# Patient Record
Sex: MALE | Race: WHITE | Employment: FULL TIME | URBAN - METROPOLITAN AREA
[De-identification: names, ages, dates, MRNs, and addresses within clinical notes are randomized per-mention and may not be internally consistent; named-entity substitution may affect disease eponyms.]

---

## 2017-01-03 ENCOUNTER — OFFICE VISIT (OUTPATIENT)
Dept: FAMILY MEDICINE CLINIC | Age: 47
End: 2017-01-03

## 2017-01-03 VITALS
SYSTOLIC BLOOD PRESSURE: 136 MMHG | HEIGHT: 70 IN | WEIGHT: 208 LBS | HEART RATE: 91 BPM | DIASTOLIC BLOOD PRESSURE: 88 MMHG | BODY MASS INDEX: 29.78 KG/M2 | TEMPERATURE: 97.7 F | RESPIRATION RATE: 16 BRPM

## 2017-01-03 DIAGNOSIS — M77.8 TENDINITIS OF RIGHT WRIST: Primary | ICD-10-CM

## 2017-01-03 DIAGNOSIS — M18.11 DEGENERATIVE ARTHRITIS OF THUMB, RIGHT: ICD-10-CM

## 2017-01-03 PROCEDURE — 99213 OFFICE O/P EST LOW 20 MIN: CPT | Performed by: FAMILY MEDICINE

## 2017-01-03 RX ORDER — PREDNISONE 20 MG/1
TABLET ORAL
Qty: 18 TABLET | Refills: 0 | Status: SHIPPED | OUTPATIENT
Start: 2017-01-03 | End: 2017-01-13

## 2017-01-03 RX ORDER — DICLOFENAC SODIUM 75 MG/1
75 TABLET, DELAYED RELEASE ORAL 2 TIMES DAILY
Qty: 60 TABLET | Refills: 3 | Status: SHIPPED | OUTPATIENT
Start: 2017-01-03 | End: 2017-11-08 | Stop reason: ALTCHOICE

## 2017-01-08 ASSESSMENT — ENCOUNTER SYMPTOMS
CHEST TIGHTNESS: 0
BACK PAIN: 0
COUGH: 0

## 2017-01-31 ENCOUNTER — OFFICE VISIT (OUTPATIENT)
Dept: FAMILY MEDICINE CLINIC | Age: 47
End: 2017-01-31

## 2017-01-31 VITALS
SYSTOLIC BLOOD PRESSURE: 134 MMHG | BODY MASS INDEX: 30.55 KG/M2 | WEIGHT: 213.4 LBS | HEART RATE: 78 BPM | TEMPERATURE: 97.5 F | DIASTOLIC BLOOD PRESSURE: 84 MMHG | HEIGHT: 70 IN | RESPIRATION RATE: 20 BRPM

## 2017-01-31 DIAGNOSIS — M18.11 LOCALIZED PRIMARY OSTEOARTHRITIS OF CARPOMETACARPAL JOINT OF RIGHT THUMB: ICD-10-CM

## 2017-01-31 DIAGNOSIS — M67.431 GANGLION OF WRIST, RIGHT: ICD-10-CM

## 2017-01-31 DIAGNOSIS — M18.12 LOCALIZED PRIMARY OSTEOARTHRITIS OF CARPOMETACARPAL JOINT OF LEFT THUMB: Primary | ICD-10-CM

## 2017-01-31 DIAGNOSIS — F17.200 TOBACCO USE DISORDER: ICD-10-CM

## 2017-01-31 PROCEDURE — 4004F PT TOBACCO SCREEN RCVD TLK: CPT | Performed by: FAMILY MEDICINE

## 2017-01-31 PROCEDURE — G8427 DOCREV CUR MEDS BY ELIG CLIN: HCPCS | Performed by: FAMILY MEDICINE

## 2017-01-31 PROCEDURE — G8484 FLU IMMUNIZE NO ADMIN: HCPCS | Performed by: FAMILY MEDICINE

## 2017-01-31 PROCEDURE — G8419 CALC BMI OUT NRM PARAM NOF/U: HCPCS | Performed by: FAMILY MEDICINE

## 2017-01-31 PROCEDURE — 99213 OFFICE O/P EST LOW 20 MIN: CPT | Performed by: FAMILY MEDICINE

## 2017-01-31 RX ORDER — VARENICLINE TARTRATE 25 MG
KIT ORAL
Qty: 56 TABLET | Refills: 0 | Status: SHIPPED | OUTPATIENT
Start: 2017-01-31 | End: 2017-05-23

## 2017-01-31 RX ORDER — VARENICLINE TARTRATE 1 MG/1
1 TABLET, FILM COATED ORAL 2 TIMES DAILY
Qty: 60 TABLET | Refills: 1 | Status: SHIPPED | OUTPATIENT
Start: 2017-01-31 | End: 2017-05-23

## 2017-01-31 RX ORDER — HYDROCODONE BITARTRATE AND ACETAMINOPHEN 7.5; 325 MG/1; MG/1
1 TABLET ORAL EVERY 8 HOURS PRN
Qty: 50 TABLET | Refills: 0 | Status: SHIPPED | OUTPATIENT
Start: 2017-01-31 | End: 2017-03-17 | Stop reason: SDUPTHER

## 2017-01-31 ASSESSMENT — PATIENT HEALTH QUESTIONNAIRE - PHQ9
1. LITTLE INTEREST OR PLEASURE IN DOING THINGS: 0
SUM OF ALL RESPONSES TO PHQ9 QUESTIONS 1 & 2: 0
SUM OF ALL RESPONSES TO PHQ QUESTIONS 1-9: 0
2. FEELING DOWN, DEPRESSED OR HOPELESS: 0

## 2017-03-16 ENCOUNTER — TELEPHONE (OUTPATIENT)
Dept: FAMILY MEDICINE CLINIC | Age: 47
End: 2017-03-16

## 2017-03-17 ENCOUNTER — OFFICE VISIT (OUTPATIENT)
Dept: FAMILY MEDICINE CLINIC | Age: 47
End: 2017-03-17

## 2017-03-17 VITALS
HEIGHT: 70 IN | BODY MASS INDEX: 29.35 KG/M2 | SYSTOLIC BLOOD PRESSURE: 122 MMHG | HEART RATE: 86 BPM | TEMPERATURE: 97.8 F | RESPIRATION RATE: 16 BRPM | DIASTOLIC BLOOD PRESSURE: 88 MMHG | WEIGHT: 205 LBS

## 2017-03-17 DIAGNOSIS — R94.2 ABNORMAL LUNG FUNCTION TEST: ICD-10-CM

## 2017-03-17 DIAGNOSIS — M54.50 CHRONIC BILATERAL LOW BACK PAIN WITHOUT SCIATICA: ICD-10-CM

## 2017-03-17 DIAGNOSIS — M18.11 LOCALIZED PRIMARY OSTEOARTHRITIS OF CARPOMETACARPAL JOINT OF RIGHT THUMB: ICD-10-CM

## 2017-03-17 DIAGNOSIS — G89.29 CHRONIC BILATERAL LOW BACK PAIN WITHOUT SCIATICA: ICD-10-CM

## 2017-03-17 DIAGNOSIS — R06.02 SOB (SHORTNESS OF BREATH): Primary | ICD-10-CM

## 2017-03-17 PROCEDURE — 4004F PT TOBACCO SCREEN RCVD TLK: CPT | Performed by: FAMILY MEDICINE

## 2017-03-17 PROCEDURE — G8419 CALC BMI OUT NRM PARAM NOF/U: HCPCS | Performed by: FAMILY MEDICINE

## 2017-03-17 PROCEDURE — G8427 DOCREV CUR MEDS BY ELIG CLIN: HCPCS | Performed by: FAMILY MEDICINE

## 2017-03-17 PROCEDURE — G8484 FLU IMMUNIZE NO ADMIN: HCPCS | Performed by: FAMILY MEDICINE

## 2017-03-17 PROCEDURE — 99213 OFFICE O/P EST LOW 20 MIN: CPT | Performed by: FAMILY MEDICINE

## 2017-03-17 RX ORDER — HYDROCODONE BITARTRATE AND ACETAMINOPHEN 7.5; 325 MG/1; MG/1
1 TABLET ORAL EVERY 8 HOURS PRN
Qty: 50 TABLET | Refills: 0 | Status: SHIPPED | OUTPATIENT
Start: 2017-03-17 | End: 2017-11-08

## 2017-03-23 ENCOUNTER — HOSPITAL ENCOUNTER (OUTPATIENT)
Dept: PULMONOLOGY | Age: 47
Discharge: HOME | End: 2017-03-23
Payer: COMMERCIAL

## 2017-03-23 DIAGNOSIS — R94.2 ABNORMAL LUNG FUNCTION TEST: ICD-10-CM

## 2017-03-23 DIAGNOSIS — R06.02 SOB (SHORTNESS OF BREATH): ICD-10-CM

## 2017-03-23 PROCEDURE — 94729 DIFFUSING CAPACITY: CPT

## 2017-03-23 PROCEDURE — 6360000002 HC RX W HCPCS: Performed by: FAMILY MEDICINE

## 2017-03-23 PROCEDURE — 94729 DIFFUSING CAPACITY: CPT | Performed by: INTERNAL MEDICINE

## 2017-03-23 PROCEDURE — 94726 PLETHYSMOGRAPHY LUNG VOLUMES: CPT | Performed by: INTERNAL MEDICINE

## 2017-03-23 PROCEDURE — 94726 PLETHYSMOGRAPHY LUNG VOLUMES: CPT

## 2017-03-23 PROCEDURE — 94060 EVALUATION OF WHEEZING: CPT | Performed by: INTERNAL MEDICINE

## 2017-03-23 PROCEDURE — 94060 EVALUATION OF WHEEZING: CPT

## 2017-03-23 RX ORDER — ALBUTEROL SULFATE 2.5 MG/3ML
2.5 SOLUTION RESPIRATORY (INHALATION) ONCE
Status: COMPLETED | OUTPATIENT
Start: 2017-03-23 | End: 2017-03-23

## 2017-03-23 RX ADMIN — ALBUTEROL SULFATE 2.5 MG: 2.5 SOLUTION RESPIRATORY (INHALATION) at 15:56

## 2017-03-28 ENCOUNTER — OFFICE VISIT (OUTPATIENT)
Dept: FAMILY MEDICINE CLINIC | Age: 47
End: 2017-03-28

## 2017-03-28 VITALS
DIASTOLIC BLOOD PRESSURE: 76 MMHG | RESPIRATION RATE: 18 BRPM | HEART RATE: 84 BPM | HEIGHT: 70 IN | BODY MASS INDEX: 29.84 KG/M2 | WEIGHT: 208.4 LBS | TEMPERATURE: 98.8 F | SYSTOLIC BLOOD PRESSURE: 118 MMHG

## 2017-03-28 DIAGNOSIS — J44.9 MODERATE COPD (CHRONIC OBSTRUCTIVE PULMONARY DISEASE) (HCC): Primary | ICD-10-CM

## 2017-03-28 PROCEDURE — G8484 FLU IMMUNIZE NO ADMIN: HCPCS | Performed by: FAMILY MEDICINE

## 2017-03-28 PROCEDURE — 3023F SPIROM DOC REV: CPT | Performed by: FAMILY MEDICINE

## 2017-03-28 PROCEDURE — G8417 CALC BMI ABV UP PARAM F/U: HCPCS | Performed by: FAMILY MEDICINE

## 2017-03-28 PROCEDURE — G8427 DOCREV CUR MEDS BY ELIG CLIN: HCPCS | Performed by: FAMILY MEDICINE

## 2017-03-28 PROCEDURE — 4004F PT TOBACCO SCREEN RCVD TLK: CPT | Performed by: FAMILY MEDICINE

## 2017-03-28 PROCEDURE — G8926 SPIRO NO PERF OR DOC: HCPCS | Performed by: FAMILY MEDICINE

## 2017-03-28 PROCEDURE — 99213 OFFICE O/P EST LOW 20 MIN: CPT | Performed by: FAMILY MEDICINE

## 2017-04-03 ENCOUNTER — TELEPHONE (OUTPATIENT)
Dept: FAMILY MEDICINE CLINIC | Age: 47
End: 2017-04-03

## 2017-05-23 ENCOUNTER — OFFICE VISIT (OUTPATIENT)
Dept: PULMONOLOGY | Age: 47
End: 2017-05-23

## 2017-05-23 VITALS
BODY MASS INDEX: 29.2 KG/M2 | OXYGEN SATURATION: 98 % | HEIGHT: 70 IN | HEART RATE: 84 BPM | TEMPERATURE: 97.7 F | WEIGHT: 204 LBS | DIASTOLIC BLOOD PRESSURE: 86 MMHG | RESPIRATION RATE: 16 BRPM | SYSTOLIC BLOOD PRESSURE: 138 MMHG

## 2017-05-23 DIAGNOSIS — G47.33 OBSTRUCTIVE SLEEP APNEA SYNDROME: ICD-10-CM

## 2017-05-23 DIAGNOSIS — J44.9 CHRONIC OBSTRUCTIVE PULMONARY DISEASE, UNSPECIFIED COPD TYPE (HCC): Primary | ICD-10-CM

## 2017-05-23 PROCEDURE — G8427 DOCREV CUR MEDS BY ELIG CLIN: HCPCS | Performed by: INTERNAL MEDICINE

## 2017-05-23 PROCEDURE — 3023F SPIROM DOC REV: CPT | Performed by: INTERNAL MEDICINE

## 2017-05-23 PROCEDURE — G8926 SPIRO NO PERF OR DOC: HCPCS | Performed by: INTERNAL MEDICINE

## 2017-05-23 PROCEDURE — 99204 OFFICE O/P NEW MOD 45 MIN: CPT | Performed by: INTERNAL MEDICINE

## 2017-05-23 PROCEDURE — 4004F PT TOBACCO SCREEN RCVD TLK: CPT | Performed by: INTERNAL MEDICINE

## 2017-05-23 PROCEDURE — G8417 CALC BMI ABV UP PARAM F/U: HCPCS | Performed by: INTERNAL MEDICINE

## 2017-05-23 RX ORDER — FLUTICASONE FUROATE AND VILANTEROL 100; 25 UG/1; UG/1
1 POWDER RESPIRATORY (INHALATION) DAILY
Qty: 1 EACH | Refills: 5 | Status: SHIPPED | OUTPATIENT
Start: 2017-05-23 | End: 2017-08-23 | Stop reason: SDUPTHER

## 2017-05-23 RX ORDER — ALBUTEROL SULFATE 90 UG/1
2 AEROSOL, METERED RESPIRATORY (INHALATION) EVERY 6 HOURS PRN
Qty: 1 INHALER | Refills: 5 | Status: SHIPPED | OUTPATIENT
Start: 2017-05-23 | End: 2017-08-23 | Stop reason: SDUPTHER

## 2017-05-23 ASSESSMENT — ENCOUNTER SYMPTOMS
RHINORRHEA: 0
ABDOMINAL PAIN: 0
NAUSEA: 0
SORE THROAT: 0
CHEST TIGHTNESS: 0
SHORTNESS OF BREATH: 1
COUGH: 1
WHEEZING: 1
VOMITING: 0
DIARRHEA: 0
SINUS PRESSURE: 0

## 2017-08-23 ENCOUNTER — OFFICE VISIT (OUTPATIENT)
Dept: PULMONOLOGY | Age: 47
End: 2017-08-23

## 2017-08-23 VITALS
WEIGHT: 200 LBS | BODY MASS INDEX: 28.63 KG/M2 | HEART RATE: 79 BPM | RESPIRATION RATE: 14 BRPM | TEMPERATURE: 98.2 F | DIASTOLIC BLOOD PRESSURE: 74 MMHG | HEIGHT: 70 IN | SYSTOLIC BLOOD PRESSURE: 120 MMHG | OXYGEN SATURATION: 98 %

## 2017-08-23 DIAGNOSIS — Z72.0 TOBACCO ABUSE: ICD-10-CM

## 2017-08-23 DIAGNOSIS — G47.33 OBSTRUCTIVE SLEEP APNEA SYNDROME: ICD-10-CM

## 2017-08-23 DIAGNOSIS — J44.9 CHRONIC OBSTRUCTIVE PULMONARY DISEASE, UNSPECIFIED COPD TYPE (HCC): Primary | ICD-10-CM

## 2017-08-23 PROCEDURE — G8926 SPIRO NO PERF OR DOC: HCPCS | Performed by: PHYSICIAN ASSISTANT

## 2017-08-23 PROCEDURE — 4004F PT TOBACCO SCREEN RCVD TLK: CPT | Performed by: PHYSICIAN ASSISTANT

## 2017-08-23 PROCEDURE — 3023F SPIROM DOC REV: CPT | Performed by: PHYSICIAN ASSISTANT

## 2017-08-23 PROCEDURE — G8427 DOCREV CUR MEDS BY ELIG CLIN: HCPCS | Performed by: PHYSICIAN ASSISTANT

## 2017-08-23 PROCEDURE — 99213 OFFICE O/P EST LOW 20 MIN: CPT | Performed by: PHYSICIAN ASSISTANT

## 2017-08-23 PROCEDURE — G8417 CALC BMI ABV UP PARAM F/U: HCPCS | Performed by: PHYSICIAN ASSISTANT

## 2017-08-23 RX ORDER — ALBUTEROL SULFATE 90 UG/1
2 AEROSOL, METERED RESPIRATORY (INHALATION) EVERY 6 HOURS PRN
Qty: 1 INHALER | Refills: 5 | Status: SHIPPED | OUTPATIENT
Start: 2017-08-23

## 2017-08-23 RX ORDER — FLUTICASONE FUROATE AND VILANTEROL 100; 25 UG/1; UG/1
1 POWDER RESPIRATORY (INHALATION) DAILY
Qty: 1 EACH | Refills: 5 | Status: SHIPPED | OUTPATIENT
Start: 2017-08-23 | End: 2019-02-19

## 2017-08-23 ASSESSMENT — ENCOUNTER SYMPTOMS
ABDOMINAL PAIN: 0
WHEEZING: 0
SINUS PRESSURE: 0
COUGH: 1
NAUSEA: 0
DIARRHEA: 0
SORE THROAT: 0
RHINORRHEA: 0
SHORTNESS OF BREATH: 1
VOMITING: 0
CHEST TIGHTNESS: 0

## 2017-10-24 ENCOUNTER — OFFICE VISIT (OUTPATIENT)
Dept: FAMILY MEDICINE CLINIC | Age: 47
End: 2017-10-24

## 2017-10-24 VITALS
SYSTOLIC BLOOD PRESSURE: 118 MMHG | HEIGHT: 69 IN | DIASTOLIC BLOOD PRESSURE: 78 MMHG | BODY MASS INDEX: 31.01 KG/M2 | WEIGHT: 209.4 LBS | HEART RATE: 87 BPM | RESPIRATION RATE: 18 BRPM | TEMPERATURE: 96.9 F

## 2017-10-24 DIAGNOSIS — K43.9 VENTRAL HERNIA WITHOUT OBSTRUCTION OR GANGRENE: Primary | ICD-10-CM

## 2017-10-24 PROCEDURE — G8484 FLU IMMUNIZE NO ADMIN: HCPCS | Performed by: INTERNAL MEDICINE

## 2017-10-24 PROCEDURE — 99213 OFFICE O/P EST LOW 20 MIN: CPT | Performed by: INTERNAL MEDICINE

## 2017-10-24 PROCEDURE — 4004F PT TOBACCO SCREEN RCVD TLK: CPT | Performed by: INTERNAL MEDICINE

## 2017-10-24 PROCEDURE — G8417 CALC BMI ABV UP PARAM F/U: HCPCS | Performed by: INTERNAL MEDICINE

## 2017-10-24 PROCEDURE — G8427 DOCREV CUR MEDS BY ELIG CLIN: HCPCS | Performed by: INTERNAL MEDICINE

## 2017-10-24 RX ORDER — ACETAMINOPHEN 500 MG
1000 TABLET ORAL EVERY 6 HOURS PRN
Qty: 120 TABLET | Refills: 3 | Status: SHIPPED | OUTPATIENT
Start: 2017-10-24 | End: 2019-02-19

## 2017-10-24 NOTE — PROGRESS NOTES
Subjective:      Patient ID: Anika Crespo is a 41 y.o. male    HPI   Patient presents with midline supraumbilical abdominal bulge x 4 years. First noticed by family member and not the patient. Since then bulge has increased in size. Associated dull pain, rated 8/10, non radiating, worse with straining or lifting heavy objects, improved with no activity. No fever or chills, no constipation or diarrhea, no nausea or vomiting. Patient works in [employer] and occasionally  carries heavy objects. Positive history of 3 inguinal hernias with repair at ages 5, 3 and 17 years. Past Medical History:   Diagnosis Date    Knee pain     left     Lower back pain      Past Surgical History:   Procedure Laterality Date    HERNIA REPAIR      3 hernia repairs    KNEE SURGERY  11 years ago    left     Social History     Social History    Marital status:      Spouse name: N/A    Number of children: N/A    Years of education: N/A     Occupational History    Not on file. Social History Main Topics    Smoking status: Current Every Day Smoker     Packs/day: 1.50    Smokeless tobacco: Never Used    Alcohol use 5.0 oz/week     10 drink(s) per week    Drug use: No    Sexual activity: Not on file     Other Topics Concern    Not on file     Social History Narrative    No narrative on file     History reviewed. No pertinent family history. Allergies:  Review of patient's allergies indicates no known allergies.   Patient Active Problem List   Diagnosis    Hyperlipidemia    Chronic low back pain    Sleep apnea     Current Outpatient Prescriptions on File Prior to Visit   Medication Sig Dispense Refill    fluticasone-vilanterol (BREO ELLIPTA) 100-25 MCG/INH AEPB inhaler Inhale 1 puff into the lungs daily 1 each 5    albuterol sulfate HFA (PROAIR HFA) 108 (90 Base) MCG/ACT inhaler Inhale 2 puffs into the lungs every 6 hours as needed for Wheezing 1 Inhaler 5    HYDROcodone-acetaminophen (NORCO) 7.5-325 MG per tablet Take 1 tablet by mouth every 8 hours as needed for Pain . 50 tablet 0    diclofenac (VOLTAREN) 75 MG EC tablet Take 1 tablet by mouth 2 times daily 60 tablet 3    ibuprofen (ADVIL;MOTRIN) 800 MG tablet Take 800 mg by mouth 2 times daily as needed. No current facility-administered medications on file prior to visit. Review of Systems   Constitutional: Negative for activity change, appetite change, chills, diaphoresis, fatigue, fever and unexpected weight change. HENT: Negative for congestion, dental problem, drooling, ear discharge, ear pain, hearing loss, mouth sores, sore throat, trouble swallowing and voice change. Eyes: Negative for photophobia, pain, discharge, redness and itching. Respiratory: Negative for cough, shortness of breath and wheezing. Gastrointestinal: Negative for abdominal distention, abdominal pain, blood in stool, constipation, diarrhea and nausea. Endocrine: Negative for cold intolerance, heat intolerance, polydipsia and polyuria. Genitourinary: Negative for decreased urine volume, difficulty urinating, dysuria, flank pain, frequency, hematuria and urgency. Musculoskeletal: Negative for arthralgias, back pain and joint swelling. Skin: Negative for color change. Allergic/Immunologic: Negative for environmental allergies and food allergies. Neurological: Negative for dizziness, seizures, syncope, weakness, light-headedness, numbness and headaches. Psychiatric/Behavioral: Negative for agitation, decreased concentration, dysphoric mood and hallucinations. The patient is not nervous/anxious. Objective:   /78   Pulse 87   Temp 96.9 °F (36.1 °C) (Temporal)   Resp 18   Ht 5' 9\" (1.753 m)   Wt 209 lb 6.4 oz (95 kg)   BMI 30.92 kg/m²     Physical Exam   Constitutional: He appears well-developed and well-nourished. No distress. Cardiovascular: Normal rate, regular rhythm, S1 normal, S2 normal and normal heart sounds. Pulmonary/Chest: No accessory muscle usage. No tachypnea. No respiratory distress. He has no wheezes. He has no rales. Abdominal: Soft. Bowel sounds are normal. He exhibits mass (midline supraumbilical mass most prominenet upon attempt to lie down, coughing or sitting up. Mass measures about 6x6cm, soft reducible and non TTP). There is no tenderness. There is no rebound and no guarding. Musculoskeletal: He exhibits no edema. Skin: He is not diaphoretic. Assessment:      1. Ventral hernia without obstruction or gangrene  [address] [facility]    acetaminophen (APAP EXTRA STRENGTH) 500 MG tablet         Plan:      Orders Placed This Encounter   Procedures   [address] [place] [address] [place] Surgery     Referral Priority:   Routine     Referral Type:   Consult for Advice and Opinion     Referral Reason:   Specialty Services Required     Referred to Provider:   Duglas Mejia MD     Requested Specialty:   General Surgery     Number of Visits Requested:   1     Orders Placed This Encounter   Medications    acetaminophen (APAP EXTRA STRENGTH) 500 MG tablet     Sig: Take 2 tablets by mouth every 6 hours as needed for Pain     Dispense:  120 tablet     Refill:  3       Return if symptoms worsen or fail to improve.

## 2017-10-25 ASSESSMENT — ENCOUNTER SYMPTOMS
COUGH: 0
COLOR CHANGE: 0
PHOTOPHOBIA: 0
DIARRHEA: 0
BLOOD IN STOOL: 0
ABDOMINAL PAIN: 0
WHEEZING: 0
SORE THROAT: 0
EYE PAIN: 0
EYE ITCHING: 0
EYE DISCHARGE: 0
SHORTNESS OF BREATH: 0
ABDOMINAL DISTENTION: 0
EYE REDNESS: 0
NAUSEA: 0
VOICE CHANGE: 0
TROUBLE SWALLOWING: 0
CONSTIPATION: 0
BACK PAIN: 0

## 2017-11-08 ENCOUNTER — OFFICE VISIT (OUTPATIENT)
Dept: SURGERY | Age: 47
End: 2017-11-08

## 2017-11-08 VITALS
DIASTOLIC BLOOD PRESSURE: 86 MMHG | SYSTOLIC BLOOD PRESSURE: 130 MMHG | HEIGHT: 70 IN | BODY MASS INDEX: 29.78 KG/M2 | TEMPERATURE: 97.2 F | WEIGHT: 208 LBS

## 2017-11-08 DIAGNOSIS — K43.6 VENTRAL HERNIA WITH OBSTRUCTION AND WITHOUT GANGRENE: Primary | ICD-10-CM

## 2017-11-08 PROCEDURE — G8484 FLU IMMUNIZE NO ADMIN: HCPCS | Performed by: SURGERY

## 2017-11-08 PROCEDURE — 99201 PR OFFICE OUTPATIENT NEW 10 MINUTES: CPT | Performed by: SURGERY

## 2017-11-08 PROCEDURE — G8417 CALC BMI ABV UP PARAM F/U: HCPCS | Performed by: SURGERY

## 2017-11-08 PROCEDURE — G8427 DOCREV CUR MEDS BY ELIG CLIN: HCPCS | Performed by: SURGERY

## 2017-11-08 PROCEDURE — 4004F PT TOBACCO SCREEN RCVD TLK: CPT | Performed by: SURGERY

## 2017-11-08 NOTE — PROGRESS NOTES
Subjective:      Patient ID: Tigre Nevarez is a 41 y.o. male. HPI  Patient presents today with complaints of a ventral hernia. He has noted a lump on the abdominal wall for 5 to 6 years but recently he has noted increasing discomfort in the area especially with lifting. Past Medical History:   Diagnosis Date    COPD (chronic obstructive pulmonary disease) (HCC)     Knee pain     left     Lower back pain      Past Surgical History:   Procedure Laterality Date    HERNIA REPAIR      3 hernia repairs    KNEE SURGERY  11 years ago    left     Current Outpatient Prescriptions   Medication Sig Dispense Refill    acetaminophen (APAP EXTRA STRENGTH) 500 MG tablet Take 2 tablets by mouth every 6 hours as needed for Pain 120 tablet 3    fluticasone-vilanterol (BREO ELLIPTA) 100-25 MCG/INH AEPB inhaler Inhale 1 puff into the lungs daily 1 each 5    albuterol sulfate HFA (PROAIR HFA) 108 (90 Base) MCG/ACT inhaler Inhale 2 puffs into the lungs every 6 hours as needed for Wheezing 1 Inhaler 5     No current facility-administered medications for this visit. No Known Allergies    Review of Systems His appetite is good. There is no nausea or vomiting. There is no change in his bowel habits. He lives with his girlfriend. He has a physical job. Objective:   Physical Exam He does not appear ill or toxic. Breath sounds are clear. The cardiac exam is normal. The belly is non distended. There is a mass palpable in the epigastric midline. The mass is tender. It is about 2 x 1 cm and is soft and squishy. I am not able to \"reduce\" the mass. He has no trouble getting on and off the exam table and ambulates fine. Assessment:      Ventral hernia which I am not able to reduce today. It is possible the mass is a lipoma    He is a smoker with COPD/. Plan:     The etiology and natural history of hernias was discussed. I explained that hernias will not resolve with exercise or a special diet.      Options for management were discussed. Hernias that are not symptomatic can be observed. Signs and symptoms of incarceration and strangulation were reviewed. Details of surgical repair were discussed. Use of mesh was reviewed. Risks of infection, bleeding, poor wound healing, recurrent hernia, nerve injury, urinary retention, etc were discussed and questions were answered. The patient would like repair scheduled.

## 2018-01-04 ENCOUNTER — HOSPITAL ENCOUNTER (OUTPATIENT)
Dept: PREADMISSION TESTING | Age: 48
Discharge: HOME | End: 2018-01-04
Payer: COMMERCIAL

## 2018-01-04 VITALS
HEIGHT: 70 IN | RESPIRATION RATE: 16 BRPM | OXYGEN SATURATION: 97 % | HEART RATE: 86 BPM | BODY MASS INDEX: 30.55 KG/M2 | DIASTOLIC BLOOD PRESSURE: 86 MMHG | TEMPERATURE: 96.8 F | SYSTOLIC BLOOD PRESSURE: 146 MMHG | WEIGHT: 213.4 LBS

## 2018-01-04 DIAGNOSIS — M54.9 UPPER BACK PAIN: Chronic | ICD-10-CM

## 2018-01-04 PROBLEM — K43.9 VENTRAL HERNIA: Chronic | Status: ACTIVE | Noted: 2018-01-04

## 2018-01-04 LAB
ANION GAP SERPL CALCULATED.3IONS-SCNC: 15 MEQ/L (ref 7–13)
BUN BLDV-MCNC: 18 MG/DL (ref 6–20)
CALCIUM SERPL-MCNC: 9.5 MG/DL (ref 8.6–10.2)
CHLORIDE BLD-SCNC: 102 MEQ/L (ref 98–107)
CO2: 26 MEQ/L (ref 22–29)
CREAT SERPL-MCNC: 1.13 MG/DL (ref 0.7–1.2)
EKG ATRIAL RATE: 80 BPM
EKG P AXIS: 25 DEGREES
EKG P-R INTERVAL: 142 MS
EKG Q-T INTERVAL: 368 MS
EKG QRS DURATION: 90 MS
EKG QTC CALCULATION (BAZETT): 424 MS
EKG R AXIS: 27 DEGREES
EKG T AXIS: 49 DEGREES
EKG VENTRICULAR RATE: 80 BPM
GFR AFRICAN AMERICAN: >60
GFR NON-AFRICAN AMERICAN: >60
GLUCOSE BLD-MCNC: 82 MG/DL (ref 74–109)
HCT VFR BLD CALC: 47.2 % (ref 42–52)
HEMOGLOBIN: 15.9 G/DL (ref 14–18)
MCH RBC QN AUTO: 30.7 PG (ref 27–31.3)
MCHC RBC AUTO-ENTMCNC: 33.8 % (ref 33–37)
MCV RBC AUTO: 90.8 FL (ref 80–100)
PDW BLD-RTO: 13.9 % (ref 11.5–14.5)
PLATELET # BLD: 312 K/UL (ref 130–400)
POTASSIUM SERPL-SCNC: 4.5 MEQ/L (ref 3.5–5.1)
RBC # BLD: 5.2 M/UL (ref 4.7–6.1)
SODIUM BLD-SCNC: 143 MEQ/L (ref 132–144)
WBC # BLD: 8.2 K/UL (ref 4.8–10.8)

## 2018-01-04 PROCEDURE — 85027 COMPLETE CBC AUTOMATED: CPT

## 2018-01-04 PROCEDURE — 93005 ELECTROCARDIOGRAM TRACING: CPT

## 2018-01-04 PROCEDURE — 80048 BASIC METABOLIC PNL TOTAL CA: CPT

## 2018-01-04 RX ORDER — SODIUM CHLORIDE 0.9 % (FLUSH) 0.9 %
10 SYRINGE (ML) INJECTION EVERY 12 HOURS SCHEDULED
Status: CANCELLED | OUTPATIENT
Start: 2018-01-04

## 2018-01-04 RX ORDER — LIDOCAINE HYDROCHLORIDE 10 MG/ML
1 INJECTION, SOLUTION EPIDURAL; INFILTRATION; INTRACAUDAL; PERINEURAL
Status: CANCELLED | OUTPATIENT
Start: 2018-01-04 | End: 2018-01-04

## 2018-01-04 RX ORDER — SODIUM CHLORIDE 9 MG/ML
INJECTION, SOLUTION INTRAVENOUS CONTINUOUS
Status: CANCELLED | OUTPATIENT
Start: 2018-01-09

## 2018-01-04 RX ORDER — SODIUM CHLORIDE 0.9 % (FLUSH) 0.9 %
10 SYRINGE (ML) INJECTION PRN
Status: CANCELLED | OUTPATIENT
Start: 2018-01-04

## 2018-01-05 PROCEDURE — 93010 ELECTROCARDIOGRAM REPORT: CPT | Performed by: INTERNAL MEDICINE

## 2018-01-05 RX ORDER — SODIUM CHLORIDE, SODIUM LACTATE, POTASSIUM CHLORIDE, CALCIUM CHLORIDE 600; 310; 30; 20 MG/100ML; MG/100ML; MG/100ML; MG/100ML
INJECTION, SOLUTION INTRAVENOUS CONTINUOUS
Status: CANCELLED | OUTPATIENT
Start: 2018-01-05

## 2018-01-09 ENCOUNTER — ANESTHESIA EVENT (OUTPATIENT)
Dept: OPERATING ROOM | Age: 48
End: 2018-01-09
Payer: COMMERCIAL

## 2018-01-09 ENCOUNTER — ANESTHESIA (OUTPATIENT)
Dept: OPERATING ROOM | Age: 48
End: 2018-01-09
Payer: COMMERCIAL

## 2018-01-09 ENCOUNTER — HOSPITAL ENCOUNTER (OUTPATIENT)
Age: 48
Setting detail: OUTPATIENT SURGERY
Discharge: HOME | End: 2018-01-09
Attending: SURGERY | Admitting: SURGERY
Payer: COMMERCIAL

## 2018-01-09 VITALS — OXYGEN SATURATION: 99 % | SYSTOLIC BLOOD PRESSURE: 121 MMHG | DIASTOLIC BLOOD PRESSURE: 78 MMHG | TEMPERATURE: 95.7 F

## 2018-01-09 VITALS
OXYGEN SATURATION: 95 % | SYSTOLIC BLOOD PRESSURE: 137 MMHG | HEART RATE: 74 BPM | RESPIRATION RATE: 20 BRPM | DIASTOLIC BLOOD PRESSURE: 92 MMHG | TEMPERATURE: 97.2 F

## 2018-01-09 DIAGNOSIS — G89.18 POST-OP PAIN: ICD-10-CM

## 2018-01-09 DIAGNOSIS — K43.6 VENTRAL HERNIA WITH OBSTRUCTION AND WITHOUT GANGRENE: Primary | Chronic | ICD-10-CM

## 2018-01-09 PROBLEM — R22.2 MASS OF ANTERIOR ABDOMINAL WALL: Status: ACTIVE | Noted: 2018-01-09

## 2018-01-09 PROCEDURE — 2580000003 HC RX 258: Performed by: SURGERY

## 2018-01-09 PROCEDURE — 6360000002 HC RX W HCPCS: Performed by: NURSE ANESTHETIST, CERTIFIED REGISTERED

## 2018-01-09 PROCEDURE — 3700000000 HC ANESTHESIA ATTENDED CARE: Performed by: SURGERY

## 2018-01-09 PROCEDURE — A6402 STERILE GAUZE <= 16 SQ IN: HCPCS | Performed by: SURGERY

## 2018-01-09 PROCEDURE — 6360000002 HC RX W HCPCS: Performed by: ANESTHESIOLOGY

## 2018-01-09 PROCEDURE — 7100000010 HC PHASE II RECOVERY - FIRST 15 MIN: Performed by: SURGERY

## 2018-01-09 PROCEDURE — 3600000002 HC SURGERY LEVEL 2 BASE: Performed by: SURGERY

## 2018-01-09 PROCEDURE — 88302 TISSUE EXAM BY PATHOLOGIST: CPT

## 2018-01-09 PROCEDURE — 49561 PR REPAIR INCISIONAL HERNIA,STRANG: CPT | Performed by: SURGERY

## 2018-01-09 PROCEDURE — 7100000011 HC PHASE II RECOVERY - ADDTL 15 MIN: Performed by: SURGERY

## 2018-01-09 PROCEDURE — 3700000001 HC ADD 15 MINUTES (ANESTHESIA): Performed by: SURGERY

## 2018-01-09 PROCEDURE — 6370000000 HC RX 637 (ALT 250 FOR IP): Performed by: SURGERY

## 2018-01-09 PROCEDURE — 2500000003 HC RX 250 WO HCPCS: Performed by: SURGERY

## 2018-01-09 PROCEDURE — 7100000001 HC PACU RECOVERY - ADDTL 15 MIN: Performed by: SURGERY

## 2018-01-09 PROCEDURE — 7100000000 HC PACU RECOVERY - FIRST 15 MIN: Performed by: SURGERY

## 2018-01-09 PROCEDURE — 6360000002 HC RX W HCPCS: Performed by: SURGERY

## 2018-01-09 PROCEDURE — 3600000012 HC SURGERY LEVEL 2 ADDTL 15MIN: Performed by: SURGERY

## 2018-01-09 PROCEDURE — 2500000003 HC RX 250 WO HCPCS: Performed by: NURSE ANESTHETIST, CERTIFIED REGISTERED

## 2018-01-09 RX ORDER — SODIUM CHLORIDE 9 MG/ML
INJECTION, SOLUTION INTRAVENOUS CONTINUOUS
Status: DISCONTINUED | OUTPATIENT
Start: 2018-01-09 | End: 2018-01-09 | Stop reason: HOSPADM

## 2018-01-09 RX ORDER — SODIUM CHLORIDE 0.9 % (FLUSH) 0.9 %
10 SYRINGE (ML) INJECTION PRN
Status: DISCONTINUED | OUTPATIENT
Start: 2018-01-09 | End: 2018-01-09 | Stop reason: HOSPADM

## 2018-01-09 RX ORDER — ONDANSETRON 2 MG/ML
INJECTION INTRAMUSCULAR; INTRAVENOUS PRN
Status: DISCONTINUED | OUTPATIENT
Start: 2018-01-09 | End: 2018-01-09 | Stop reason: SDUPTHER

## 2018-01-09 RX ORDER — MEPERIDINE HYDROCHLORIDE 25 MG/ML
12.5 INJECTION INTRAMUSCULAR; INTRAVENOUS; SUBCUTANEOUS EVERY 5 MIN PRN
Status: DISCONTINUED | OUTPATIENT
Start: 2018-01-09 | End: 2018-01-09 | Stop reason: HOSPADM

## 2018-01-09 RX ORDER — FENTANYL CITRATE 50 UG/ML
INJECTION, SOLUTION INTRAMUSCULAR; INTRAVENOUS PRN
Status: DISCONTINUED | OUTPATIENT
Start: 2018-01-09 | End: 2018-01-09 | Stop reason: SDUPTHER

## 2018-01-09 RX ORDER — ROCURONIUM BROMIDE 10 MG/ML
INJECTION, SOLUTION INTRAVENOUS PRN
Status: DISCONTINUED | OUTPATIENT
Start: 2018-01-09 | End: 2018-01-09 | Stop reason: SDUPTHER

## 2018-01-09 RX ORDER — OXYCODONE HYDROCHLORIDE AND ACETAMINOPHEN 5; 325 MG/1; MG/1
1 TABLET ORAL EVERY 6 HOURS PRN
Qty: 28 TABLET | Refills: 0 | Status: SHIPPED | OUTPATIENT
Start: 2018-01-09 | End: 2018-01-16

## 2018-01-09 RX ORDER — LIDOCAINE HYDROCHLORIDE 10 MG/ML
1 INJECTION, SOLUTION EPIDURAL; INFILTRATION; INTRACAUDAL; PERINEURAL
Status: DISCONTINUED | OUTPATIENT
Start: 2018-01-09 | End: 2018-01-09 | Stop reason: HOSPADM

## 2018-01-09 RX ORDER — KETOROLAC TROMETHAMINE 30 MG/ML
INJECTION, SOLUTION INTRAMUSCULAR; INTRAVENOUS PRN
Status: DISCONTINUED | OUTPATIENT
Start: 2018-01-09 | End: 2018-01-09 | Stop reason: SDUPTHER

## 2018-01-09 RX ORDER — SODIUM CHLORIDE, SODIUM LACTATE, POTASSIUM CHLORIDE, CALCIUM CHLORIDE 600; 310; 30; 20 MG/100ML; MG/100ML; MG/100ML; MG/100ML
INJECTION, SOLUTION INTRAVENOUS CONTINUOUS
Status: DISCONTINUED | OUTPATIENT
Start: 2018-01-09 | End: 2018-01-09 | Stop reason: HOSPADM

## 2018-01-09 RX ORDER — DIPHENHYDRAMINE HYDROCHLORIDE 50 MG/ML
12.5 INJECTION INTRAMUSCULAR; INTRAVENOUS
Status: DISCONTINUED | OUTPATIENT
Start: 2018-01-09 | End: 2018-01-09 | Stop reason: HOSPADM

## 2018-01-09 RX ORDER — MIDAZOLAM HYDROCHLORIDE 1 MG/ML
INJECTION INTRAMUSCULAR; INTRAVENOUS PRN
Status: DISCONTINUED | OUTPATIENT
Start: 2018-01-09 | End: 2018-01-09 | Stop reason: SDUPTHER

## 2018-01-09 RX ORDER — ONDANSETRON 2 MG/ML
4 INJECTION INTRAMUSCULAR; INTRAVENOUS EVERY 6 HOURS PRN
Status: DISCONTINUED | OUTPATIENT
Start: 2018-01-09 | End: 2018-01-09 | Stop reason: HOSPADM

## 2018-01-09 RX ORDER — LIDOCAINE HYDROCHLORIDE 20 MG/ML
INJECTION, SOLUTION INFILTRATION; PERINEURAL PRN
Status: DISCONTINUED | OUTPATIENT
Start: 2018-01-09 | End: 2018-01-09 | Stop reason: SDUPTHER

## 2018-01-09 RX ORDER — HYDROCODONE BITARTRATE AND ACETAMINOPHEN 5; 325 MG/1; MG/1
2 TABLET ORAL PRN
Status: DISCONTINUED | OUTPATIENT
Start: 2018-01-09 | End: 2018-01-09 | Stop reason: HOSPADM

## 2018-01-09 RX ORDER — PROPOFOL 10 MG/ML
INJECTION, EMULSION INTRAVENOUS PRN
Status: DISCONTINUED | OUTPATIENT
Start: 2018-01-09 | End: 2018-01-09 | Stop reason: SDUPTHER

## 2018-01-09 RX ORDER — ACETAMINOPHEN 325 MG/1
650 TABLET ORAL EVERY 4 HOURS PRN
Status: DISCONTINUED | OUTPATIENT
Start: 2018-01-09 | End: 2018-01-09 | Stop reason: HOSPADM

## 2018-01-09 RX ORDER — DEXAMETHASONE SODIUM PHOSPHATE 10 MG/ML
INJECTION INTRAMUSCULAR; INTRAVENOUS PRN
Status: DISCONTINUED | OUTPATIENT
Start: 2018-01-09 | End: 2018-01-09 | Stop reason: SDUPTHER

## 2018-01-09 RX ORDER — METOCLOPRAMIDE HYDROCHLORIDE 5 MG/ML
10 INJECTION INTRAMUSCULAR; INTRAVENOUS
Status: DISCONTINUED | OUTPATIENT
Start: 2018-01-09 | End: 2018-01-09 | Stop reason: HOSPADM

## 2018-01-09 RX ORDER — MAGNESIUM HYDROXIDE 1200 MG/15ML
LIQUID ORAL CONTINUOUS PRN
Status: DISCONTINUED | OUTPATIENT
Start: 2018-01-09 | End: 2018-01-09 | Stop reason: HOSPADM

## 2018-01-09 RX ORDER — ONDANSETRON 2 MG/ML
4 INJECTION INTRAMUSCULAR; INTRAVENOUS
Status: DISCONTINUED | OUTPATIENT
Start: 2018-01-09 | End: 2018-01-09 | Stop reason: HOSPADM

## 2018-01-09 RX ORDER — SODIUM CHLORIDE 0.9 % (FLUSH) 0.9 %
10 SYRINGE (ML) INJECTION EVERY 12 HOURS SCHEDULED
Status: DISCONTINUED | OUTPATIENT
Start: 2018-01-09 | End: 2018-01-09 | Stop reason: HOSPADM

## 2018-01-09 RX ORDER — FENTANYL CITRATE 50 UG/ML
50 INJECTION, SOLUTION INTRAMUSCULAR; INTRAVENOUS EVERY 10 MIN PRN
Status: DISCONTINUED | OUTPATIENT
Start: 2018-01-09 | End: 2018-01-09 | Stop reason: HOSPADM

## 2018-01-09 RX ORDER — ACETAMINOPHEN 650 MG/1
650 SUPPOSITORY RECTAL EVERY 4 HOURS PRN
Status: DISCONTINUED | OUTPATIENT
Start: 2018-01-09 | End: 2018-01-09 | Stop reason: HOSPADM

## 2018-01-09 RX ORDER — OXYCODONE HYDROCHLORIDE AND ACETAMINOPHEN 5; 325 MG/1; MG/1
2 TABLET ORAL EVERY 4 HOURS PRN
Status: DISCONTINUED | OUTPATIENT
Start: 2018-01-09 | End: 2018-01-09 | Stop reason: HOSPADM

## 2018-01-09 RX ORDER — BUPIVACAINE HYDROCHLORIDE 5 MG/ML
INJECTION, SOLUTION EPIDURAL; INTRACAUDAL PRN
Status: DISCONTINUED | OUTPATIENT
Start: 2018-01-09 | End: 2018-01-09 | Stop reason: HOSPADM

## 2018-01-09 RX ORDER — OXYCODONE HYDROCHLORIDE AND ACETAMINOPHEN 5; 325 MG/1; MG/1
1 TABLET ORAL EVERY 4 HOURS PRN
Status: DISCONTINUED | OUTPATIENT
Start: 2018-01-09 | End: 2018-01-09 | Stop reason: HOSPADM

## 2018-01-09 RX ORDER — HYDROCODONE BITARTRATE AND ACETAMINOPHEN 5; 325 MG/1; MG/1
1 TABLET ORAL PRN
Status: DISCONTINUED | OUTPATIENT
Start: 2018-01-09 | End: 2018-01-09 | Stop reason: HOSPADM

## 2018-01-09 RX ADMIN — ROCURONIUM BROMIDE 50 MG: 10 INJECTION INTRAVENOUS at 07:34

## 2018-01-09 RX ADMIN — CEFTRIAXONE SODIUM 1 G: 10 INJECTION, POWDER, FOR SOLUTION INTRAVENOUS at 07:27

## 2018-01-09 RX ADMIN — MIDAZOLAM HYDROCHLORIDE 2 MG: 1 INJECTION, SOLUTION INTRAMUSCULAR; INTRAVENOUS at 07:25

## 2018-01-09 RX ADMIN — SODIUM CHLORIDE: 900 INJECTION, SOLUTION INTRAVENOUS at 06:41

## 2018-01-09 RX ADMIN — SODIUM CHLORIDE: 900 INJECTION, SOLUTION INTRAVENOUS at 08:03

## 2018-01-09 RX ADMIN — Medication 0.5 MG: at 08:35

## 2018-01-09 RX ADMIN — Medication 0.5 MG: at 08:25

## 2018-01-09 RX ADMIN — FENTANYL CITRATE 100 MCG: 50 INJECTION, SOLUTION INTRAMUSCULAR; INTRAVENOUS at 07:34

## 2018-01-09 RX ADMIN — PROPOFOL 200 MG: 10 INJECTION, EMULSION INTRAVENOUS at 07:34

## 2018-01-09 RX ADMIN — OXYCODONE HYDROCHLORIDE AND ACETAMINOPHEN 1 TABLET: 5; 325 TABLET ORAL at 10:02

## 2018-01-09 RX ADMIN — Medication 0.5 MG: at 08:45

## 2018-01-09 RX ADMIN — KETOROLAC TROMETHAMINE 30 MG: 30 INJECTION, SOLUTION INTRAMUSCULAR; INTRAVENOUS at 07:58

## 2018-01-09 RX ADMIN — PROPOFOL 50 MG: 10 INJECTION, EMULSION INTRAVENOUS at 08:01

## 2018-01-09 RX ADMIN — PROPOFOL 50 MG: 10 INJECTION, EMULSION INTRAVENOUS at 07:58

## 2018-01-09 RX ADMIN — MIDAZOLAM HYDROCHLORIDE 2 MG: 1 INJECTION, SOLUTION INTRAMUSCULAR; INTRAVENOUS at 07:27

## 2018-01-09 RX ADMIN — ONDANSETRON 4 MG: 2 INJECTION INTRAMUSCULAR; INTRAVENOUS at 07:54

## 2018-01-09 RX ADMIN — LIDOCAINE HYDROCHLORIDE 100 MG: 20 INJECTION, SOLUTION INFILTRATION; PERINEURAL at 07:34

## 2018-01-09 RX ADMIN — SUGAMMADEX 200 MG: 100 INJECTION, SOLUTION INTRAVENOUS at 08:06

## 2018-01-09 RX ADMIN — Medication 0.5 MG: at 08:55

## 2018-01-09 RX ADMIN — DEXAMETHASONE SODIUM PHOSPHATE 8 MG: 10 INJECTION INTRAMUSCULAR; INTRAVENOUS at 07:44

## 2018-01-09 ASSESSMENT — PULMONARY FUNCTION TESTS
PIF_VALUE: 13
PIF_VALUE: 16
PIF_VALUE: 17
PIF_VALUE: 16
PIF_VALUE: 17
PIF_VALUE: 14
PIF_VALUE: 17
PIF_VALUE: 13
PIF_VALUE: 13
PIF_VALUE: 23
PIF_VALUE: 17
PIF_VALUE: 19
PIF_VALUE: 16
PIF_VALUE: 12
PIF_VALUE: 16
PIF_VALUE: 16
PIF_VALUE: 13
PIF_VALUE: 3
PIF_VALUE: 17
PIF_VALUE: 16
PIF_VALUE: 17
PIF_VALUE: 3
PIF_VALUE: 17
PIF_VALUE: 17
PIF_VALUE: 14
PIF_VALUE: 16
PIF_VALUE: 16
PIF_VALUE: 17
PIF_VALUE: 16
PIF_VALUE: 2
PIF_VALUE: 16
PIF_VALUE: 13
PIF_VALUE: 17
PIF_VALUE: 2
PIF_VALUE: 16
PIF_VALUE: 16
PIF_VALUE: 17
PIF_VALUE: 2
PIF_VALUE: 20

## 2018-01-09 ASSESSMENT — PAIN - FUNCTIONAL ASSESSMENT: PAIN_FUNCTIONAL_ASSESSMENT: 0-10

## 2018-01-09 ASSESSMENT — PAIN SCALES - GENERAL
PAINLEVEL_OUTOF10: 8
PAINLEVEL_OUTOF10: 6
PAINLEVEL_OUTOF10: 3
PAINLEVEL_OUTOF10: 9
PAINLEVEL_OUTOF10: 6
PAINLEVEL_OUTOF10: 8

## 2018-01-09 ASSESSMENT — COPD QUESTIONNAIRES: CAT_SEVERITY: NO INTERVAL CHANGE

## 2018-01-09 NOTE — ANESTHESIA POSTPROCEDURE EVALUATION
Department of Anesthesiology  Postprocedure Note    Patient: Corey Khan  MRN: [id number]  YOB: 1970  Date of evaluation: 1/9/2018  Time:  8:24 AM     Procedure Summary     Date:  01/09/18 Room / Location:  [facility] OR 08 / [facility] OR    Anesthesia Start:  3052 Anesthesia Stop:  1314    Procedure:  REPAIR VENTRAL HERNIA (N/A ) Diagnosis:  (VENTRAL HERNIA )    Surgeon:  Marisa Lora MD Responsible Provider:  Sofía Espinoza MD    Anesthesia Type:  general ASA Status:  3          Anesthesia Type: general    Jesus Phase I: Jesus Score: 10    Jesus Phase II:      Last vitals: Reviewed and per EMR flowsheets.        Anesthesia Post Evaluation    Patient location during evaluation: bedside  Patient participation: complete - patient participated  Level of consciousness: awake and awake and alert  Pain score: 0  Airway patency: patent  Nausea & Vomiting: no nausea and no vomiting  Complications: no  Cardiovascular status: blood pressure returned to baseline and hemodynamically stable  Respiratory status: acceptable  Hydration status: euvolemic

## 2018-01-10 NOTE — OP NOTE
out from the subcu using blunt  dissection with my fingers as well as cautery. I came in inferiorly and  superiorly and then worked lateral to medial.  The mass did turn out to be  a hernia sac. The defect was just off the midline. I amputated the fat  and hernia sac at the fascial edges. The defect was about 7 or 8 mm. I  could just barely get my finger in and palpate circumferentially, I did not  note any other defects. A primary repair was done using 0 Prolene in an  interrupted figure-of-eight manner. The subcu was rinsed and closed in  layers using 3-0 Vicryl interrupted deep and 4-0 Vicryl in a subcuticular  manner on the skin. The incision was anesthetized with 0.5% Marcaine and  dressed with Steri-Strips, gauze and tape. A binder was placed. Blood loss was minimal.  He was extubated and taken  to the recovery room with good vital signs.         Hunter Hodges MD    D: 01/09/2018 13:29:12       T: 01/09/2018 13:32:10     [email or web address]  Job#: [id number]     Doc#: [id number]    CC:

## 2018-01-17 ENCOUNTER — OFFICE VISIT (OUTPATIENT)
Dept: SURGERY | Age: 48
End: 2018-01-17

## 2018-01-17 VITALS
TEMPERATURE: 98 F | BODY MASS INDEX: 30.64 KG/M2 | DIASTOLIC BLOOD PRESSURE: 82 MMHG | SYSTOLIC BLOOD PRESSURE: 126 MMHG | HEIGHT: 70 IN | WEIGHT: 214 LBS

## 2018-01-17 DIAGNOSIS — Z09 SURGICAL FOLLOWUP: Primary | ICD-10-CM

## 2018-01-17 PROBLEM — R22.2 MASS OF ANTERIOR ABDOMINAL WALL: Status: RESOLVED | Noted: 2018-01-09 | Resolved: 2018-01-17

## 2018-01-17 PROCEDURE — 99024 POSTOP FOLLOW-UP VISIT: CPT | Performed by: SURGERY

## 2018-01-17 RX ORDER — OXYCODONE HYDROCHLORIDE AND ACETAMINOPHEN 5; 325 MG/1; MG/1
1 TABLET ORAL EVERY 6 HOURS PRN
COMMUNITY
End: 2019-02-19

## 2018-01-17 NOTE — PROGRESS NOTES
Surgery Progress Note. He is here today for surgical follow up. He is s/p primary repair of a ventral hernia on 1/9/2018. He is still having some soreness in the incision. He is sleeping at night. His appetite is improving. He is moving his bowels. He appears well. He has no trouble getting on and off the exam table. There are steri strips still on the incision which is clear without cellulitis or drainage. The belly is soft and benign. The operative findings were reviewed. Satisfactory course    Activity level was discussed. He will return to work on 1/29/2018. He will recheck with me as needed.

## 2018-01-17 NOTE — LETTER
Zeferino Cervantes MD  [address]  [facility] [address] [place]  Phone: [phone]  Fax: [phone]      Re: Zoe Prescott  : 1970      To Whom It May Concern: It is my medical opinion that Hassan Prader may return to work on 18 with no restrictions. Please call with any questions or concerns.     Thank you,        Zeferino Cervantes MD   Electronically signed by Munir Griggs, 2:39 PM 18

## 2019-02-19 ENCOUNTER — OFFICE VISIT (OUTPATIENT)
Dept: FAMILY MEDICINE CLINIC | Age: 49
End: 2019-02-19
Payer: COMMERCIAL

## 2019-02-19 VITALS
OXYGEN SATURATION: 97 % | HEART RATE: 93 BPM | DIASTOLIC BLOOD PRESSURE: 86 MMHG | BODY MASS INDEX: 31.07 KG/M2 | SYSTOLIC BLOOD PRESSURE: 124 MMHG | HEIGHT: 70 IN | WEIGHT: 217 LBS

## 2019-02-19 DIAGNOSIS — M79.672 FOOT PAIN, LEFT: Primary | ICD-10-CM

## 2019-02-19 DIAGNOSIS — G89.29 CHRONIC BILATERAL LOW BACK PAIN WITHOUT SCIATICA: ICD-10-CM

## 2019-02-19 DIAGNOSIS — J44.9 CHRONIC OBSTRUCTIVE PULMONARY DISEASE, UNSPECIFIED COPD TYPE (HCC): ICD-10-CM

## 2019-02-19 DIAGNOSIS — M54.50 CHRONIC BILATERAL LOW BACK PAIN WITHOUT SCIATICA: ICD-10-CM

## 2019-02-19 DIAGNOSIS — Z72.0 TOBACCO ABUSE: ICD-10-CM

## 2019-02-19 PROCEDURE — 4004F PT TOBACCO SCREEN RCVD TLK: CPT | Performed by: FAMILY MEDICINE

## 2019-02-19 PROCEDURE — G8484 FLU IMMUNIZE NO ADMIN: HCPCS | Performed by: FAMILY MEDICINE

## 2019-02-19 PROCEDURE — 99213 OFFICE O/P EST LOW 20 MIN: CPT | Performed by: FAMILY MEDICINE

## 2019-02-19 PROCEDURE — G8417 CALC BMI ABV UP PARAM F/U: HCPCS | Performed by: FAMILY MEDICINE

## 2019-02-19 PROCEDURE — G8926 SPIRO NO PERF OR DOC: HCPCS | Performed by: FAMILY MEDICINE

## 2019-02-19 PROCEDURE — 3023F SPIROM DOC REV: CPT | Performed by: FAMILY MEDICINE

## 2019-02-19 PROCEDURE — G8427 DOCREV CUR MEDS BY ELIG CLIN: HCPCS | Performed by: FAMILY MEDICINE

## 2019-02-19 RX ORDER — METHYLPREDNISOLONE 4 MG/1
TABLET ORAL
Qty: 1 KIT | Refills: 0 | Status: SHIPPED | OUTPATIENT
Start: 2019-02-19

## 2019-02-19 RX ORDER — MELOXICAM 15 MG/1
15 TABLET ORAL DAILY
Qty: 30 TABLET | Refills: 3 | Status: SHIPPED | OUTPATIENT
Start: 2019-02-19

## 2019-02-19 ASSESSMENT — PATIENT HEALTH QUESTIONNAIRE - PHQ9
1. LITTLE INTEREST OR PLEASURE IN DOING THINGS: 0
SUM OF ALL RESPONSES TO PHQ9 QUESTIONS 1 & 2: 0
2. FEELING DOWN, DEPRESSED OR HOPELESS: 0
SUM OF ALL RESPONSES TO PHQ QUESTIONS 1-9: 0
SUM OF ALL RESPONSES TO PHQ QUESTIONS 1-9: 0

## 2019-05-06 ENCOUNTER — TELEPHONE (OUTPATIENT)
Dept: FAMILY MEDICINE CLINIC | Age: 49
End: 2019-05-06

## 2023-02-24 PROBLEM — G89.29 CHRONIC BILATERAL LOW BACK PAIN WITH LEFT-SIDED SCIATICA: Status: ACTIVE | Noted: 2023-02-24

## 2023-02-24 PROBLEM — R06.83 SNORING: Status: ACTIVE | Noted: 2023-02-24

## 2023-02-24 PROBLEM — G89.29 CHRONIC HAND PAIN, RIGHT: Status: ACTIVE | Noted: 2023-02-24

## 2023-02-24 PROBLEM — J44.9 COPD (CHRONIC OBSTRUCTIVE PULMONARY DISEASE) (MULTI): Status: ACTIVE | Noted: 2023-02-24

## 2023-02-24 PROBLEM — F17.210 CIGARETTE SMOKER: Status: ACTIVE | Noted: 2023-02-24

## 2023-02-24 PROBLEM — E66.811 CLASS 1 OBESITY WITH BODY MASS INDEX (BMI) OF 30.0 TO 30.9 IN ADULT: Status: ACTIVE | Noted: 2023-02-24

## 2023-02-24 PROBLEM — E66.9 CLASS 1 OBESITY WITH BODY MASS INDEX (BMI) OF 30.0 TO 30.9 IN ADULT: Status: ACTIVE | Noted: 2023-02-24

## 2023-02-24 PROBLEM — E66.3 OVERWEIGHT WITH BODY MASS INDEX (BMI) OF 29 TO 29.9 IN ADULT: Status: ACTIVE | Noted: 2023-02-24

## 2023-02-24 PROBLEM — G47.11 IDIOPATHIC HYPERSOMNIA: Status: ACTIVE | Noted: 2023-02-24

## 2023-02-24 PROBLEM — M79.642 CHRONIC HAND PAIN, LEFT: Status: ACTIVE | Noted: 2023-02-24

## 2023-02-24 PROBLEM — R03.0 BLOOD PRESSURE ELEVATED WITHOUT HISTORY OF HTN: Status: ACTIVE | Noted: 2023-02-24

## 2023-02-24 PROBLEM — E66.3 OVERWEIGHT WITH BODY MASS INDEX (BMI) OF 28 TO 28.9 IN ADULT: Status: ACTIVE | Noted: 2023-02-24

## 2023-02-24 PROBLEM — M54.42 CHRONIC BILATERAL LOW BACK PAIN WITH LEFT-SIDED SCIATICA: Status: ACTIVE | Noted: 2023-02-24

## 2023-02-24 PROBLEM — G89.29 CHRONIC HAND PAIN, LEFT: Status: ACTIVE | Noted: 2023-02-24

## 2023-02-24 PROBLEM — M79.641 CHRONIC HAND PAIN, RIGHT: Status: ACTIVE | Noted: 2023-02-24

## 2023-02-24 RX ORDER — TRAMADOL HYDROCHLORIDE 50 MG/1
1-2 TABLET ORAL 2 TIMES DAILY PRN
COMMUNITY
End: 2023-03-22 | Stop reason: SDUPTHER

## 2023-02-24 RX ORDER — DICLOFENAC SODIUM 75 MG/1
75 TABLET, DELAYED RELEASE ORAL 2 TIMES DAILY
COMMUNITY
End: 2023-03-22

## 2023-02-24 RX ORDER — GABAPENTIN 600 MG/1
600 TABLET ORAL 2 TIMES DAILY
COMMUNITY
End: 2023-03-22 | Stop reason: SDUPTHER

## 2023-03-21 NOTE — PROGRESS NOTES
Subjective   Patient ID: Kobe Cisneros is a 52 y.o. male who presents for follow up.    HPI     Patient is here for follow-up on his chronic back pain.  He reports experiencing some back pain for the past 5 to 7 years but has been worse over the past 1 year.    He does have a hx of past lumbar surgery.  Patient denies any lower extremity weakness or incoordination.  He denies any saddle anesthesia.  He denies any bowel or bladder dysfunction.     2/22/2023:   Dosage of Gabapentin increased from 300 mg to 600 mg.   Patient was also prescribed 75 mg Diclofenac tablets to be taken as 1 tablet twice per day with food.   3/22/2023: Patient reports that his back pain did not improve significantly since starting the diclofenac and gabapentin.        OARRS:  Francisco Ashford DO on 3/22/2023  4:27 PM  I have personally reviewed the OARRS report for Kobe Cisneros. I have considered the risks of abuse, dependence, addiction and diversion    Is the patient prescribed a combination of a benzodiazepine and opioid?  No    Last Urine Drug Screen / ordered today: No  Recent Results (from the past 26450 hour(s))   OPIATE/OPIOID/BENZO PRESCRIPTION COMPLIANCE    Collection Time: 01/23/23  5:54 PM   Result Value Ref Range    DRUG SCREEN COMMENT URINE SEE BELOW     Creatine, Urine 175.1 mg/dL    Amphetamine Screen, Urine PRESUMPTIVE NEGATIVE NEGATIVE    Barbiturate Screen, Urine PRESUMPTIVE NEGATIVE NEGATIVE    Cannabinoid Screen, Urine PRESUMPTIVE NEGATIVE NEGATIVE    Cocaine Screen, Urine PRESUMPTIVE NEGATIVE NEGATIVE    PCP Screen, Urine PRESUMPTIVE NEGATIVE NEGATIVE    7-Aminoclonazepam <25 Cutoff <25 ng/mL    Alpha-Hydroxyalprazolam <25 Cutoff <25 ng/mL    Alpha-Hydroxymidazolam <25 Cutoff <25 ng/mL    Alprazolam <25 Cutoff <25 ng/mL    Chlordiazepoxide <25 Cutoff <25 ng/mL    Clonazepam <25 Cutoff <25 ng/mL    Diazepam <25 Cutoff <25 ng/mL    Lorazepam <25 Cutoff <25 ng/mL    Midazolam <25 Cutoff <25 ng/mL    Nordiazepam <25  Cutoff <25 ng/mL    Oxazepam <25 Cutoff <25 ng/mL    Temazepam <25 Cutoff <25 ng/mL    Zolpidem <25 Cutoff <25 ng/mL    Zolpidem Metabolite (ZCA) <25 Cutoff <25 ng/mL    6-Acetylmorphine <25 Cutoff <25 ng/mL    Codeine <50 Cutoff <50 ng/mL    Hydrocodone <25 Cutoff <25 ng/mL    Hydromorphone <25 Cutoff <25 ng/mL    Morphine Urine <50 Cutoff <50 ng/mL    Norhydrocodone <25 Cutoff <25 ng/mL    Noroxycodone <25 Cutoff <25 ng/mL    Oxycodone <25 Cutoff <25 ng/mL    Oxymorphone <25 Cutoff <25 ng/mL    Tramadol <50 Cutoff <50 ng/mL    O-Desmethyltramadol <50 Cutoff <50 ng/mL    Fentanyl <2.5 Cutoff<2.5 ng/mL    Norfentanyl <2.5 Cutoff<2.5 ng/mL    METHADONE CONFIRMATION,URINE <25 Cutoff <25 ng/mL    EDDP <25 Cutoff <25 ng/mL     Results are as expected.     Controlled Substance Agreement:  Date of the Last Agreement: 3/22/14262  Reviewed Controlled Substance Agreement including but not limited to the benefits, risks, and alternatives to treatment with a Controlled Substance medication(s).    Opioids:  What is the patient's goal of therapy? Reduce pain  Is this being achieved with current treatment? no    I have calculated the patient's Morphine Dose Equivalent (MED):   I have considered referral to Pain Management and/or a specialist, and do not feel it is necessary at this time.    I feel that it is clinically indicated to continue this current medication regimen after consideration of alternative therapies, and other non-opioid treatment.    Opioid Risk Screening:  Family History of Substance Abuse  Alcohol: 0 (3/22/2023  4:00 PM)  Illegal Drugs: 0 (3/22/2023  4:00 PM)  Prescription Drugs: 0 (3/22/2023  4:00 PM)    Personal History of Substance Abuse  Alcohol: 0 (3/22/2023  4:00 PM)  Illegal Drugs: 0 (3/22/2023  4:00 PM)  Prescription Drugs: 0 (3/22/2023  4:00 PM)    Patient Age (16-45)  Age (16-45): 0 (3/22/2023  4:00 PM)    History of Preadolescent Sexual Abuse  History of Preadolescent Sexual Abuse: .0 (3/22/2023   4:00 PM)    Psychological Disease  Attention Deficit Disorder, Obsessive Compulsive Disorder, Bipolar, Schizophrenia: 0 (3/22/2023  4:00 PM)  Depression: 0 (3/22/2023  4:00 PM)    Total Score  Total Score: 0 (3/22/2023  4:00 PM)    Total Score Risk Category  TOTAL SCORE CATEGORY: Low Risk (0-3) (3/22/2023  4:00 PM)        Pain Assessment:  Analgesia  What was your pain level on average during the past week?: 4  What was your pain level at its worst during the past week?: 8  What percentage of your pain has been relieved during the past week?: 3 %  Is the amount of pain relief you are now obtaining from your current pain relievers enough to make a real difference in your life?: N  Query to Clinician: Is the patient's pain relief clinically significant?: Unsure    Activities of Daily Living  Physical Functioning: Same  Family Relationships: Same  Social Relationships: Same  Mood: Same  Sleep Patterns: Same  Overall Functioning: Same    Adverse Events  Is patient experiencing any side effects from current pain relievers?: N      Assessment  Is your overall impression that this patient is benefiting from opioid therapy?: Unsure  Specific Analgesic Plan: Add/adjust concomitant therapy            Review of Systems: Constitutional: Patient denies any fever, chills, loss of appetite, or unexplained weight loss.  Cardiovascular: Patient denies any chest pain, shortness of breath with exertion, tachycardia, palpitations, orthopnea, or paroxysmal nocturnal dyspnea.  Respiratory: Patient denies any cough, shortness breath, or wheezing.  Skin: Denies any rashes or skin lesions  Endocrinology: Denies any polyuria, polydipsia, polyphagia, or heat/cold intolerance.     Musculoskeletal: Patient reports pain in bilateral knees, hands, elbows, and back.   Pain is most notable in his lower back.  Denies any numbness, tingling, weakness, or incoordination of the lower extremities.  He denies any change in bowel or bladder function.  He  "denies any saddle anesthesia.     Neurology:  Patient denies any new motor or sensory losses, numbness, tingling, weakness, and incoordination of the extremities. Patient also denies any seizures, or gait instability.    Objective   /84 (BP Location: Right arm, Patient Position: Sitting, BP Cuff Size: Adult)   Pulse 80   Temp 36.4 °C (97.6 °F) (Temporal)   Resp 16   Ht 1.778 m (5' 10\")   Wt 90.7 kg (200 lb)   SpO2 94%   BMI 28.70 kg/m²     Physical Exam: Gen. appearance: Alert and cooperative, no acute distress, well-developed/well-nourished overweight male    Head: Normocephalic and atraumatic.  Neck: Supple and without adenopathy, no JVD at 90Â° and no carotid bruits are noted. There is no thyromegaly, thyroid tenderness, or palpable thyroid nodules.  Cardiovascular: Regular rate and rhythm without murmur or ectopy.  Respiratory: Clear to auscultation bilaterally with good air exchange.  Neurological exam: Alert and oriented Ã--3, no tremor, normal gait.  Psychiatric: Appropriate mood and affect, good insight and judgment, no delusions or thought disorders, no suicidal or homicidal ideation.  Extremities: No clubbing, cyanosis, or edema.  Skin: Good turgor, warm, moist and without rashes or lesions.     Musculoskeletal: Examination of hands reveals hypertrophic changes at the first MTP joint. Examination of lumbar spine revealed no palpable paraspinal spasms.  SLR testing is negative bilaterally.    Assessment/Plan        Low back pain:     He can not do PT due to to constraints with work hours.       2/22/2023: Dosage of Gabapentin increased from 300 mg to 600 mg.   Patient was also prescribed 75 mg Diclofenac tablets to be taken as 1 tablet twice per day with food.   3/22/2023: We will stop the diclofenac and start him on etodolac.  Pt will continue on the gabapentin.  Tramadol to be used as needed for pain not controlled with the gabapentin and etodolac.  Referral to pain management " recommended.    Scribe Attestation  By signing my name below, I, Theodore Gallagher   attest that this documentation has been prepared under the direction and in the presence of Dr. Ashford.

## 2023-03-22 ENCOUNTER — OFFICE VISIT (OUTPATIENT)
Dept: PRIMARY CARE | Facility: CLINIC | Age: 53
End: 2023-03-22
Payer: COMMERCIAL

## 2023-03-22 VITALS
OXYGEN SATURATION: 94 % | BODY MASS INDEX: 28.63 KG/M2 | DIASTOLIC BLOOD PRESSURE: 84 MMHG | WEIGHT: 200 LBS | TEMPERATURE: 97.6 F | HEART RATE: 80 BPM | SYSTOLIC BLOOD PRESSURE: 132 MMHG | RESPIRATION RATE: 16 BRPM | HEIGHT: 70 IN

## 2023-03-22 DIAGNOSIS — M54.42 CHRONIC BILATERAL LOW BACK PAIN WITH LEFT-SIDED SCIATICA: Primary | ICD-10-CM

## 2023-03-22 DIAGNOSIS — G89.29 CHRONIC BILATERAL LOW BACK PAIN WITH LEFT-SIDED SCIATICA: Primary | ICD-10-CM

## 2023-03-22 PROCEDURE — 99213 OFFICE O/P EST LOW 20 MIN: CPT | Performed by: FAMILY MEDICINE

## 2023-03-22 RX ORDER — ETODOLAC 400 MG/1
400 TABLET, EXTENDED RELEASE ORAL DAILY
Qty: 30 TABLET | Refills: 0 | Status: SHIPPED | OUTPATIENT
Start: 2023-03-22 | End: 2023-04-20 | Stop reason: SDUPTHER

## 2023-03-22 RX ORDER — TRAMADOL HYDROCHLORIDE 50 MG/1
50-100 TABLET ORAL 2 TIMES DAILY PRN
Qty: 60 TABLET | Refills: 0 | Status: SHIPPED | OUTPATIENT
Start: 2023-03-22 | End: 2023-04-06

## 2023-03-22 RX ORDER — GABAPENTIN 600 MG/1
600 TABLET ORAL 3 TIMES DAILY
Qty: 90 TABLET | Refills: 0 | Status: SHIPPED | OUTPATIENT
Start: 2023-03-22 | End: 2023-04-20 | Stop reason: DRUGHIGH

## 2023-03-22 ASSESSMENT — PAIN SCALES - GENERAL: PAINLEVEL: 0-NO PAIN

## 2023-03-22 ASSESSMENT — LIFESTYLE VARIABLES: TOTAL SCORE: 0

## 2023-03-22 ASSESSMENT — PATIENT HEALTH QUESTIONNAIRE - PHQ9
2. FEELING DOWN, DEPRESSED OR HOPELESS: NOT AT ALL
SUM OF ALL RESPONSES TO PHQ9 QUESTIONS 1 AND 2: 0
1. LITTLE INTEREST OR PLEASURE IN DOING THINGS: NOT AT ALL

## 2023-03-22 NOTE — PATIENT INSTRUCTIONS
Begin taking prescribed etodolac dosage    Follow up in 1month.    We will move forward with referring you to pain management.    It was a pleasure to see you today. Thank you for choosing us for your health care needs.    If you have lab or other testing completed and have not been informed of results within one week, please call the office for your results.    If you haven't done so, consider signing up for [facility], the [facility] personal health record. Ask the staff how you can get started.

## 2023-04-18 PROBLEM — K43.9 VENTRAL HERNIA: Chronic | Status: ACTIVE | Noted: 2018-01-04

## 2023-04-18 RX ORDER — MELOXICAM 15 MG/1
15 TABLET ORAL DAILY
COMMUNITY
End: 2023-04-20 | Stop reason: WASHOUT

## 2023-04-18 RX ORDER — DICLOFENAC SODIUM 75 MG/1
1 TABLET, DELAYED RELEASE ORAL 2 TIMES DAILY
COMMUNITY
Start: 2023-02-22 | End: 2023-04-20 | Stop reason: WASHOUT

## 2023-04-18 RX ORDER — METHYLPREDNISOLONE 4 MG/1
4 TABLET ORAL ONCE
COMMUNITY
Start: 2019-02-19 | End: 2023-04-20 | Stop reason: WASHOUT

## 2023-04-20 ENCOUNTER — OFFICE VISIT (OUTPATIENT)
Dept: PRIMARY CARE | Facility: CLINIC | Age: 53
End: 2023-04-20
Payer: COMMERCIAL

## 2023-04-20 DIAGNOSIS — M54.42 CHRONIC BILATERAL LOW BACK PAIN WITH LEFT-SIDED SCIATICA: ICD-10-CM

## 2023-04-20 DIAGNOSIS — G89.29 CHRONIC BILATERAL LOW BACK PAIN WITH LEFT-SIDED SCIATICA: ICD-10-CM

## 2023-04-20 PROCEDURE — 99213 OFFICE O/P EST LOW 20 MIN: CPT | Performed by: FAMILY MEDICINE

## 2023-04-20 RX ORDER — GABAPENTIN 800 MG/1
800 TABLET ORAL 3 TIMES DAILY
Qty: 90 TABLET | Refills: 0 | Status: SHIPPED | OUTPATIENT
Start: 2023-04-20 | End: 2023-05-22 | Stop reason: SDUPTHER

## 2023-04-20 RX ORDER — GABAPENTIN 800 MG/1
800 TABLET ORAL 3 TIMES DAILY
Qty: 90 TABLET | Refills: 0 | Status: SHIPPED | OUTPATIENT
Start: 2023-04-20 | End: 2023-04-20 | Stop reason: SDUPTHER

## 2023-04-20 RX ORDER — GABAPENTIN 600 MG/1
600 TABLET ORAL 2 TIMES DAILY
Qty: 180 TABLET | Refills: 0 | Status: CANCELLED | OUTPATIENT
Start: 2023-04-20 | End: 2023-05-20

## 2023-04-20 RX ORDER — DICLOFENAC SODIUM 75 MG/1
75 TABLET, DELAYED RELEASE ORAL 2 TIMES DAILY
Qty: 180 TABLET | Refills: 0 | Status: CANCELLED | OUTPATIENT
Start: 2023-04-20

## 2023-04-20 RX ORDER — TRAMADOL HYDROCHLORIDE 50 MG/1
50 TABLET ORAL 2 TIMES DAILY
Qty: 90 TABLET | Refills: 0 | Status: CANCELLED | OUTPATIENT
Start: 2023-04-20

## 2023-04-20 RX ORDER — ETODOLAC 400 MG/1
400 TABLET, EXTENDED RELEASE ORAL DAILY
Qty: 30 TABLET | Refills: 0 | Status: SHIPPED | OUTPATIENT
Start: 2023-04-20 | End: 2023-05-22 | Stop reason: SDUPTHER

## 2023-04-20 RX ORDER — TRAMADOL HYDROCHLORIDE 50 MG/1
50 TABLET ORAL 2 TIMES DAILY
COMMUNITY
End: 2023-04-20 | Stop reason: SDUPTHER

## 2023-04-20 RX ORDER — TRAMADOL HYDROCHLORIDE 50 MG/1
100 TABLET ORAL 2 TIMES DAILY
Qty: 120 TABLET | Refills: 0 | Status: SHIPPED | OUTPATIENT
Start: 2023-04-20 | End: 2023-05-22 | Stop reason: SDUPTHER

## 2023-04-20 ASSESSMENT — PATIENT HEALTH QUESTIONNAIRE - PHQ9
1. LITTLE INTEREST OR PLEASURE IN DOING THINGS: NOT AT ALL
2. FEELING DOWN, DEPRESSED OR HOPELESS: NOT AT ALL
SUM OF ALL RESPONSES TO PHQ9 QUESTIONS 1 AND 2: 0

## 2023-04-20 ASSESSMENT — LIFESTYLE VARIABLES: TOTAL SCORE: 0

## 2023-04-20 NOTE — PATIENT INSTRUCTIONS
Start the etodolac if it was not started last visit.    Increase the gabapentin to 800 mg dose 3 times per day.    We will increase the dose of the tramadol to 2 tablets with each dose.    Follow up in 1 month.    It was a pleasure to see you today. Thank you for choosing us for your health care needs.    If you have lab or other testing completed and have not been informed of results within one week, please call the office for your results.    If you haven't done so, consider signing up for [facility] [employer], the [facility] personal health record. Ask the staff how you can get started.

## 2023-04-20 NOTE — PROGRESS NOTES
Subjective   Patient ID: Kobe Cisneros is a 52 y.o. male who presents for Follow-up on his persistent back pain.    HPI   OARRS:  Francisco Ashford DO on 4/20/2023  3:09 PM  I have personally reviewed the OARRS report for Kobe Cisneros. I have considered the risks of abuse, dependence, addiction and diversion    Is the patient prescribed a combination of a benzodiazepine and opioid?  No    Last Urine Drug Screen / ordered today: No  Recent Results (from the past 16656 hour(s))   OPIATE/OPIOID/BENZO PRESCRIPTION COMPLIANCE    Collection Time: 01/23/23  5:54 PM   Result Value Ref Range    DRUG SCREEN COMMENT URINE SEE BELOW     Creatine, Urine 175.1 mg/dL    Amphetamine Screen, Urine PRESUMPTIVE NEGATIVE NEGATIVE    Barbiturate Screen, Urine PRESUMPTIVE NEGATIVE NEGATIVE    Cannabinoid Screen, Urine PRESUMPTIVE NEGATIVE NEGATIVE    Cocaine Screen, Urine PRESUMPTIVE NEGATIVE NEGATIVE    PCP Screen, Urine PRESUMPTIVE NEGATIVE NEGATIVE    7-Aminoclonazepam <25 Cutoff <25 ng/mL    Alpha-Hydroxyalprazolam <25 Cutoff <25 ng/mL    Alpha-Hydroxymidazolam <25 Cutoff <25 ng/mL    Alprazolam <25 Cutoff <25 ng/mL    Chlordiazepoxide <25 Cutoff <25 ng/mL    Clonazepam <25 Cutoff <25 ng/mL    Diazepam <25 Cutoff <25 ng/mL    Lorazepam <25 Cutoff <25 ng/mL    Midazolam <25 Cutoff <25 ng/mL    Nordiazepam <25 Cutoff <25 ng/mL    Oxazepam <25 Cutoff <25 ng/mL    Temazepam <25 Cutoff <25 ng/mL    Zolpidem <25 Cutoff <25 ng/mL    Zolpidem Metabolite (ZCA) <25 Cutoff <25 ng/mL    6-Acetylmorphine <25 Cutoff <25 ng/mL    Codeine <50 Cutoff <50 ng/mL    Hydrocodone <25 Cutoff <25 ng/mL    Hydromorphone <25 Cutoff <25 ng/mL    Morphine Urine <50 Cutoff <50 ng/mL    Norhydrocodone <25 Cutoff <25 ng/mL    Noroxycodone <25 Cutoff <25 ng/mL    Oxycodone <25 Cutoff <25 ng/mL    Oxymorphone <25 Cutoff <25 ng/mL    Tramadol <50 Cutoff <50 ng/mL    O-Desmethyltramadol <50 Cutoff <50 ng/mL    Fentanyl <2.5 Cutoff<2.5 ng/mL    Norfentanyl <2.5  Cutoff<2.5 ng/mL    METHADONE CONFIRMATION,URINE <25 Cutoff <25 ng/mL    EDDP <25 Cutoff <25 ng/mL     Results are as expected.     Controlled Substance Agreement:  Date of the Last Agreement: 1/18/2023  Reviewed Controlled Substance Agreement including but not limited to the benefits, risks, and alternatives to treatment with a Controlled Substance medication(s).    Opioids:  What is the patient's goal of therapy? Reduce pain  Is this being achieved with current treatment? Helping but not to degree he would like.    I have calculated the patient's Morphine Dose Equivalent (MED):   He has been referred to pain medicine.  Pt was referred to physical therapy but has not been able to go yet due to his current work schedule.    I feel that it is clinically indicated to continue this current medication regimen after consideration of alternative therapies, and other non-opioid treatment.    Opioid Risk Screening:  Family History of Substance Abuse  Alcohol: 0 (4/20/2023  3:00 PM)  Illegal Drugs: 0 (4/20/2023  3:00 PM)  Prescription Drugs: 0 (4/20/2023  3:00 PM)    Personal History of Substance Abuse  Alcohol: 0 (4/20/2023  3:00 PM)  Illegal Drugs: 0 (4/20/2023  3:00 PM)  Prescription Drugs: 0 (4/20/2023  3:00 PM)    Patient Age (16-45)  Age (16-45): 0 (4/20/2023  3:00 PM)    History of Preadolescent Sexual Abuse  History of Preadolescent Sexual Abuse: .0 (4/20/2023  3:00 PM)    Psychological Disease  Attention Deficit Disorder, Obsessive Compulsive Disorder, Bipolar, Schizophrenia: 0 (4/20/2023  3:00 PM)  Depression: 0 (4/20/2023  3:00 PM)    Total Score  Total Score: 0 (4/20/2023  3:00 PM)    Total Score Risk Category  TOTAL SCORE CATEGORY: Low Risk (0-3) (4/20/2023  3:00 PM)      Pain Assessment:  What was your pain level on average during the last week? 3  What was your pain level at its worst during the week? 7  What percentage of your pain has been relieved during the past week?  25%.  Is the amout of pain relief you  "are now obtaining from your current pain medications enough to make a real difference in your life?  No  Is the patient's pain relief clinically significant?  Unsure  ACTIVITIES OF DAILY LIVING:  Physical functioning: Same  Family relationships:  same  Social relationships: same  Mood: same  Sleep Patterns Overall functioning: same  ADVERSE EVENTS  Is  the patient experiencing side effects from the current pain relievers? NO  Patient's overall severity of side effects: NONE  Potential Aberrant Drug Related Behavior:  NONE  ASSESSMENT:   Is your overall impression the patient is benefiting from opioid therapy? Yes  Specific Analgesic plan:  We will adjust medication today.      No new concerns        Pt reports back pain is unchanged and constant.   The pain has not been radiating recently.     Patient denies any lower extremity weakness or incoordination.  He denies any saddle anesthesia.  He denies any bowel or bladder dysfunction.     He did see pain management 3/2023 and was referred for physical therapy, but is not able to schedule with his current work schedule.        Review of Systems    Constitutional: Patient denies any fever, chills, loss of appetite, or unexplained weight loss.  Cardiovascular: Patient denies any chest pain, shortness of breath with exertion, tachycardia, palpitations, orthopnea, or paroxysmal nocturnal dyspnea.  Respiratory: Patient denies any cough, shortness breath, or wheezing.  Neurology: Patient denies any new motor or sensory losses.  Denies any numbness, tingling, weakness, and incoordination of the extremities.  Patient also denies any tremor, seizures, or gait instability.    MS:  Pt continues to experience significant lower back pain    Objective   BP (!) 161/94   Pulse 107   Temp 37 °C (98.6 °F)   Ht 1.778 m (5' 10\")   Wt 90.8 kg (200 lb 3.2 oz)   SpO2 97%   BMI 28.73 kg/m²     Physical Exam    Gen. appearance: Alert and cooperative, no acute distress, " well-developed/well-nourished overweight male    Neck: Supple and without adenopathy, no JVD at 90° and no carotid bruits are noted. There is no thyromegaly, thyroid tenderness, or palpable thyroid nodules.  Cardiovascular: Regular rate and rhythm without murmur or ectopy.  Respiratory: Clear to auscultation bilaterally with good air exchange.  Neurological exam: Alert and oriented ×3, no tremor, normal gait. SLR testing was negative bilaterally.  Extremities: No clubbing, cyanosis, or edema.  Skin: Good turgor, warm, moist and without rashes or lesions.        Assessment/Plan     1. Chronic bilateral low back pain with left-sided sciatica  We will increase the dose of gabapentin and continue the tramadol and etodolac.  He was encouraged to schedule the physical therapy as recommended by pain mgmt, but he is not able to schedule with his current work schedule.    - Follow Up In Primary Care  - etodolac XL (Lodine XL) 400 mg 24 hr tablet; Take 1 tablet (400 mg) by mouth once daily. Take with food  Dispense: 30 tablet; Refill: 0  - gabapentin (Neurontin) 800 mg tablet; Take 1 tablet (800 mg) by mouth in the morning and 1 tablet (800 mg) in the evening and 1 tablet (800 mg) before bedtime.  Dispense: 90 tablet; Refill: 0  - traMADol (Ultram) 50 mg tablet; Take 2 tablets (100 mg) by mouth in the morning and 2 tablets (100 mg) before bedtime.  Dispense: 120 tablet; Refill: 0

## 2023-05-14 VITALS
WEIGHT: 200.2 LBS | HEART RATE: 107 BPM | BODY MASS INDEX: 28.66 KG/M2 | HEIGHT: 70 IN | OXYGEN SATURATION: 97 % | TEMPERATURE: 98.6 F | DIASTOLIC BLOOD PRESSURE: 88 MMHG | SYSTOLIC BLOOD PRESSURE: 138 MMHG

## 2023-05-22 ENCOUNTER — OFFICE VISIT (OUTPATIENT)
Dept: PRIMARY CARE | Facility: CLINIC | Age: 53
End: 2023-05-22
Payer: COMMERCIAL

## 2023-05-22 VITALS
DIASTOLIC BLOOD PRESSURE: 88 MMHG | OXYGEN SATURATION: 93 % | HEART RATE: 96 BPM | WEIGHT: 197.1 LBS | TEMPERATURE: 98.3 F | BODY MASS INDEX: 28.22 KG/M2 | HEIGHT: 70 IN | SYSTOLIC BLOOD PRESSURE: 138 MMHG

## 2023-05-22 DIAGNOSIS — G89.29 CHRONIC HAND PAIN, RIGHT: ICD-10-CM

## 2023-05-22 DIAGNOSIS — M54.42 CHRONIC BILATERAL LOW BACK PAIN WITH LEFT-SIDED SCIATICA: ICD-10-CM

## 2023-05-22 DIAGNOSIS — G89.29 CHRONIC HAND PAIN, LEFT: Primary | ICD-10-CM

## 2023-05-22 DIAGNOSIS — G89.29 CHRONIC BILATERAL LOW BACK PAIN WITH LEFT-SIDED SCIATICA: ICD-10-CM

## 2023-05-22 DIAGNOSIS — R03.0 BLOOD PRESSURE ELEVATED WITHOUT HISTORY OF HTN: ICD-10-CM

## 2023-05-22 DIAGNOSIS — M79.641 CHRONIC HAND PAIN, RIGHT: ICD-10-CM

## 2023-05-22 DIAGNOSIS — M79.642 CHRONIC HAND PAIN, LEFT: Primary | ICD-10-CM

## 2023-05-22 PROCEDURE — 99214 OFFICE O/P EST MOD 30 MIN: CPT | Performed by: FAMILY MEDICINE

## 2023-05-22 RX ORDER — TRAMADOL HYDROCHLORIDE 50 MG/1
100 TABLET ORAL 2 TIMES DAILY
Qty: 120 TABLET | Refills: 0 | Status: SHIPPED | OUTPATIENT
Start: 2023-05-22 | End: 2023-06-27

## 2023-05-22 RX ORDER — GABAPENTIN 800 MG/1
800 TABLET ORAL 3 TIMES DAILY
Qty: 90 TABLET | Refills: 2 | Status: SHIPPED | OUTPATIENT
Start: 2023-05-22 | End: 2023-08-22 | Stop reason: SDUPTHER

## 2023-05-22 RX ORDER — ETODOLAC 400 MG/1
400 TABLET, EXTENDED RELEASE ORAL DAILY
Qty: 30 TABLET | Refills: 2 | Status: SHIPPED | OUTPATIENT
Start: 2023-05-22 | End: 2023-08-22 | Stop reason: SDUPTHER

## 2023-05-22 NOTE — PATIENT INSTRUCTIONS
Follow up in 3 months.    It was a pleasure to see you today. Thank you for choosing us for your health care needs.    If you have lab or other testing completed and have not been informed of results within one week, please call the office for your results.    If you haven't done so, consider signing up for [facility], the [facility] personal health record. Ask the staff how you can get started.

## 2023-05-22 NOTE — PROGRESS NOTES
Subjective   Patient ID: Kobe Cisneros is a 52 y.o. male who presents for Follow-up on his chronic back pain.    HPI     No new concerns       He hs been prescribed a combination of etodolac, gabapentin, and tramadol.     The pain has not been radiating recently and is more localized to just the lower back.  He does have a hx of past lumbar surgery.  Patient denies any lower extremity weakness or incoordination.  He denies any saddle anesthesia.  He denies any bowel or bladder dysfunction.     Pt believes that the current treatment has helped his hand pain significantly.    OARRS:  Francisco Ashford, DO on 5/22/2023  4:23 PM  I have personally reviewed the OARRS report for Kobe Cisneros. I have considered the risks of abuse, dependence, addiction and diversion    Is the patient prescribed a combination of a benzodiazepine and opioid?  No    Last Urine Drug Screen / ordered today: No  Recent Results (from the past 55914 hour(s))   OPIATE/OPIOID/BENZO PRESCRIPTION COMPLIANCE    Collection Time: 01/23/23  5:54 PM   Result Value Ref Range    DRUG SCREEN COMMENT URINE SEE BELOW     Creatine, Urine 175.1 mg/dL    Amphetamine Screen, Urine PRESUMPTIVE NEGATIVE NEGATIVE    Barbiturate Screen, Urine PRESUMPTIVE NEGATIVE NEGATIVE    Cannabinoid Screen, Urine PRESUMPTIVE NEGATIVE NEGATIVE    Cocaine Screen, Urine PRESUMPTIVE NEGATIVE NEGATIVE    PCP Screen, Urine PRESUMPTIVE NEGATIVE NEGATIVE    7-Aminoclonazepam <25 Cutoff <25 ng/mL    Alpha-Hydroxyalprazolam <25 Cutoff <25 ng/mL    Alpha-Hydroxymidazolam <25 Cutoff <25 ng/mL    Alprazolam <25 Cutoff <25 ng/mL    Chlordiazepoxide <25 Cutoff <25 ng/mL    Clonazepam <25 Cutoff <25 ng/mL    Diazepam <25 Cutoff <25 ng/mL    Lorazepam <25 Cutoff <25 ng/mL    Midazolam <25 Cutoff <25 ng/mL    Nordiazepam <25 Cutoff <25 ng/mL    Oxazepam <25 Cutoff <25 ng/mL    Temazepam <25 Cutoff <25 ng/mL    Zolpidem <25 Cutoff <25 ng/mL    Zolpidem Metabolite (ZCA) <25 Cutoff <25 ng/mL     6-Acetylmorphine <25 Cutoff <25 ng/mL    Codeine <50 Cutoff <50 ng/mL    Hydrocodone <25 Cutoff <25 ng/mL    Hydromorphone <25 Cutoff <25 ng/mL    Morphine Urine <50 Cutoff <50 ng/mL    Norhydrocodone <25 Cutoff <25 ng/mL    Noroxycodone <25 Cutoff <25 ng/mL    Oxycodone <25 Cutoff <25 ng/mL    Oxymorphone <25 Cutoff <25 ng/mL    Tramadol <50 Cutoff <50 ng/mL    O-Desmethyltramadol <50 Cutoff <50 ng/mL    Fentanyl <2.5 Cutoff<2.5 ng/mL    Norfentanyl <2.5 Cutoff<2.5 ng/mL    METHADONE CONFIRMATION,URINE <25 Cutoff <25 ng/mL    EDDP <25 Cutoff <25 ng/mL     Results are as expected.     Controlled Substance Agreement:  Date of the Last Agreement: 1/18/2023  Reviewed Controlled Substance Agreement including but not limited to the benefits, risks, and alternatives to treatment with a Controlled Substance medication(s).    Opioids:  What is the patient's goal of therapy? Reduce low back pain as needed.  Is this being achieved with current treatment? yes    I have calculated the patient's Morphine Dose Equivalent (MED):   I have considered referral to Pain Management and/or a specialist, and do not feel it is necessary at this time.    I feel that it is clinically indicated to continue this current medication regimen after consideration of alternative therapies, and other non-opioid treatment.    Opioid Risk Screening:  Family History of Substance Abuse  Alcohol: 0 (4/20/2023  3:00 PM)  Illegal Drugs: 0 (4/20/2023  3:00 PM)  Prescription Drugs: 0 (4/20/2023  3:00 PM)    Personal History of Substance Abuse  Alcohol: 0 (4/20/2023  3:00 PM)  Illegal Drugs: 0 (4/20/2023  3:00 PM)  Prescription Drugs: 0 (4/20/2023  3:00 PM)    Patient Age (16-45)  Age (16-45): 0 (4/20/2023  3:00 PM)    History of Preadolescent Sexual Abuse  History of Preadolescent Sexual Abuse: .0 (4/20/2023  3:00 PM)    Psychological Disease  Attention Deficit Disorder, Obsessive Compulsive Disorder, Bipolar, Schizophrenia: 0 (4/20/2023  3:00  "PM)  Depression: 0 (4/20/2023  3:00 PM)    Total Score  Total Score: 0 (4/20/2023  3:00 PM)    Total Score Risk Category  TOTAL SCORE CATEGORY: Low Risk (0-3) (4/20/2023  3:00 PM)        Pain Assessment:  No data recorded  SEE ANALGESIA FLOW SHEET FOR 5/22/2023 (Data did not transfer to note today).  Average pain level during past week: 4  Worst pain level during past week: 7  Percentage of pain relieved this past week: 20%        Review of Systems: Constitutional: Patient denies any fever, chills, loss of appetite, or unexplained weight loss.  Cardiovascular: Patient denies any chest pain, shortness of breath with exertion, tachycardia, palpitations, orthopnea, or paroxysmal nocturnal dyspnea.  Respiratory: Patient denies any cough, shortness breath, or wheezing.  Skin: Denies any rashes or skin lesions  Endocrinology: Denies any polyuria, polydipsia, polyphagia, or heat/cold intolerance.     Musculoskeletal: Patient reports pain in bilateral knees, hands, elbows, and back.   His back pain is the most bothersome and affects his daily activities.  Pain is worse with bending and twisting. It is better at rest.     Neurology:Patient denies any new motor or sensory losses, numbness, tingling, weakness, and incoordination of the extremities. Patient also denies any seizures, or gait instability.    Objective   /88   Pulse 96   Temp 36.8 °C (98.3 °F)   Ht 1.778 m (5' 10\")   Wt 89.4 kg (197 lb 1.6 oz)   SpO2 93%   BMI 28.28 kg/m²     Physical Exam: Gen. appearance: Alert and cooperative, no acute distress, well-developed/well-nourished overweight male     Neck: Supple and without adenopathy, no JVD at 90° and no carotid bruits are noted. There is no thyromegaly, thyroid tenderness, or palpable thyroid nodules.  Cardiovascular: Regular rate and rhythm without murmur or ectopy.  Respiratory: Clear to auscultation bilaterally with good air exchange.  Neurological exam: Alert and oriented x3, no tremor, normal " gait. SLR testing was negative bilaterally.  Extremities: No clubbing, cyanosis, or edema.  Skin: Good turgor, warm, moist and without rashes or lesions    Assessment/Plan     1. Chronic bilateral low back pain with left-sided sciatica  Back pain is better with medications, but not to the degree he would hope.  We will continue the current medications.  Pt has seen pain mgmt who recommended physical therapy.  Pt can not do PT this time of year as he can not take time off work.  He is hoping to proceed with therapy when the busy season for work is over (does [employer]).  - etodolac XL (Lodine XL) 400 mg 24 hr tablet; Take 1 tablet (400 mg) by mouth once daily. Take with food  Dispense: 30 tablet; Refill: 2  - gabapentin (Neurontin) 800 mg tablet; Take 1 tablet (800 mg) by mouth 3 times a day.  Dispense: 90 tablet; Refill: 2  - traMADol (Ultram) 50 mg tablet; Take 2 tablets (100 mg) by mouth 2 times a day.  Dispense: 120 tablet; Refill: 0    2. Chronic hand pain, left  Hand pain has improved with the current treatment regimen and we will continue with the same.    3. Chronic hand pain, right  Hand pain has improved with the current treatment regimen and we will continue with the same.    4. Blood pressure elevated without history of HTN  Blood pressure is mildly improved on today's reading.  We will continue with observation for now.  We discussed a goal of achieving a blood pressure of less than 130/80.        Scribe Attestation  By signing my name below, IDyllan , Theodore   attest that this documentation has been prepared under the direction and in the presence of Dr. Ashford.

## 2023-06-26 DIAGNOSIS — G89.29 CHRONIC BILATERAL LOW BACK PAIN WITH LEFT-SIDED SCIATICA: ICD-10-CM

## 2023-06-26 DIAGNOSIS — M54.42 CHRONIC BILATERAL LOW BACK PAIN WITH LEFT-SIDED SCIATICA: ICD-10-CM

## 2023-06-27 RX ORDER — TRAMADOL HYDROCHLORIDE 50 MG/1
100 TABLET ORAL 2 TIMES DAILY
Qty: 120 TABLET | Refills: 0 | OUTPATIENT
Start: 2023-06-27 | End: 2023-07-27

## 2023-06-27 RX ORDER — TRAMADOL HYDROCHLORIDE 50 MG/1
100 TABLET ORAL EVERY 12 HOURS PRN
Qty: 120 TABLET | Refills: 0 | Status: SHIPPED | OUTPATIENT
Start: 2023-06-27 | End: 2023-07-24 | Stop reason: SDUPTHER

## 2023-07-24 DIAGNOSIS — G89.29 CHRONIC BILATERAL LOW BACK PAIN WITH LEFT-SIDED SCIATICA: ICD-10-CM

## 2023-07-24 DIAGNOSIS — M54.42 CHRONIC BILATERAL LOW BACK PAIN WITH LEFT-SIDED SCIATICA: ICD-10-CM

## 2023-07-24 RX ORDER — TRAMADOL HYDROCHLORIDE 50 MG/1
100 TABLET ORAL EVERY 12 HOURS PRN
Qty: 120 TABLET | Refills: 0 | Status: SHIPPED | OUTPATIENT
Start: 2023-07-24 | End: 2023-08-22 | Stop reason: SDUPTHER

## 2023-08-22 ENCOUNTER — OFFICE VISIT (OUTPATIENT)
Dept: PRIMARY CARE | Facility: CLINIC | Age: 53
End: 2023-08-22
Payer: COMMERCIAL

## 2023-08-22 VITALS
WEIGHT: 199.3 LBS | HEART RATE: 99 BPM | OXYGEN SATURATION: 97 % | SYSTOLIC BLOOD PRESSURE: 144 MMHG | DIASTOLIC BLOOD PRESSURE: 90 MMHG | BODY MASS INDEX: 28.53 KG/M2 | HEIGHT: 70 IN

## 2023-08-22 DIAGNOSIS — J44.9 CHRONIC OBSTRUCTIVE PULMONARY DISEASE, UNSPECIFIED COPD TYPE (MULTI): ICD-10-CM

## 2023-08-22 DIAGNOSIS — G89.29 CHRONIC HAND PAIN, RIGHT: ICD-10-CM

## 2023-08-22 DIAGNOSIS — Z79.899 HIGH RISK MEDICATION USE: ICD-10-CM

## 2023-08-22 DIAGNOSIS — M79.641 CHRONIC HAND PAIN, RIGHT: ICD-10-CM

## 2023-08-22 DIAGNOSIS — G89.29 CHRONIC HAND PAIN, LEFT: ICD-10-CM

## 2023-08-22 DIAGNOSIS — M54.42 CHRONIC BILATERAL LOW BACK PAIN WITH LEFT-SIDED SCIATICA: Primary | ICD-10-CM

## 2023-08-22 DIAGNOSIS — G89.29 CHRONIC BILATERAL LOW BACK PAIN WITH LEFT-SIDED SCIATICA: Primary | ICD-10-CM

## 2023-08-22 DIAGNOSIS — M79.642 CHRONIC HAND PAIN, LEFT: ICD-10-CM

## 2023-08-22 DIAGNOSIS — I10 PRIMARY HYPERTENSION: ICD-10-CM

## 2023-08-22 PROCEDURE — 99214 OFFICE O/P EST MOD 30 MIN: CPT | Performed by: FAMILY MEDICINE

## 2023-08-22 PROCEDURE — 3080F DIAST BP >= 90 MM HG: CPT | Performed by: FAMILY MEDICINE

## 2023-08-22 PROCEDURE — 3077F SYST BP >= 140 MM HG: CPT | Performed by: FAMILY MEDICINE

## 2023-08-22 RX ORDER — TRAMADOL HYDROCHLORIDE 50 MG/1
100 TABLET ORAL EVERY 12 HOURS PRN
Qty: 120 TABLET | Refills: 0 | Status: SHIPPED | OUTPATIENT
Start: 2023-08-22 | End: 2023-09-25 | Stop reason: SDUPTHER

## 2023-08-22 RX ORDER — ETODOLAC 400 MG/1
400 TABLET, EXTENDED RELEASE ORAL DAILY
Qty: 30 TABLET | Refills: 2 | Status: SHIPPED | OUTPATIENT
Start: 2023-08-22 | End: 2023-11-22 | Stop reason: SDUPTHER

## 2023-08-22 RX ORDER — NALOXONE HYDROCHLORIDE 4 MG/.1ML
4 SPRAY NASAL AS NEEDED
Qty: 2 EACH | Refills: 0 | Status: SHIPPED | OUTPATIENT
Start: 2023-08-22

## 2023-08-22 RX ORDER — ALBUTEROL SULFATE 90 UG/1
2 AEROSOL, METERED RESPIRATORY (INHALATION) EVERY 4 HOURS PRN
Qty: 18 G | Refills: 0 | Status: SHIPPED | OUTPATIENT
Start: 2023-08-22 | End: 2023-11-22 | Stop reason: SDUPTHER

## 2023-08-22 RX ORDER — GABAPENTIN 800 MG/1
800 TABLET ORAL 3 TIMES DAILY
Qty: 90 TABLET | Refills: 2 | Status: SHIPPED | OUTPATIENT
Start: 2023-08-22 | End: 2023-11-22 | Stop reason: SDUPTHER

## 2023-08-22 RX ORDER — AMLODIPINE BESYLATE 5 MG/1
5 TABLET ORAL DAILY
Qty: 30 TABLET | Refills: 2 | Status: SHIPPED | OUTPATIENT
Start: 2023-08-22 | End: 2023-11-22 | Stop reason: ALTCHOICE

## 2023-08-22 NOTE — PROGRESS NOTES
Subjective   Patient ID: Kobe Cisneros is a 53 y.o. male who presents for Follow-up.    HPI     Left Shoulder pain for the last 2 weeks.  No known injury.  Pain is described as a dull achey pain.  Does not keep from any activities.       Pt has chronic pain in the lower back and hands.  He hs been prescribed a combination of etodolac, gabapentin, and tramadol.     The back pain is currently localized to the lower back.  He has a hx of past lumbar surgery.  Patient denies any lower extremity weakness or incoordination.  He denies any saddle anesthesia.  He denies any bowel or bladder dysfunction.        Current medications do help his hand pain significantly.        OARRS:  Francisco Ashford,  on 8/22/2023  4:24 PM  I have personally reviewed the OARRS report for Kobe Cisneros. I have considered the risks of abuse, dependence, addiction and diversion and I believe that it is clinically appropriate for Kobe Cisneros to be prescribed this medication    Is the patient prescribed a combination of a benzodiazepine and opioid?  No    Last Urine Drug Screen / ordered today: No  Recent Results (from the past 8760 hour(s))   OPIATE/OPIOID/BENZO PRESCRIPTION COMPLIANCE    Collection Time: 01/23/23  5:54 PM   Result Value Ref Range    DRUG SCREEN COMMENT URINE SEE BELOW     Creatine, Urine 175.1 mg/dL    Amphetamine Screen, Urine PRESUMPTIVE NEGATIVE NEGATIVE    Barbiturate Screen, Urine PRESUMPTIVE NEGATIVE NEGATIVE    Cannabinoid Screen, Urine PRESUMPTIVE NEGATIVE NEGATIVE    Cocaine Screen, Urine PRESUMPTIVE NEGATIVE NEGATIVE    PCP Screen, Urine PRESUMPTIVE NEGATIVE NEGATIVE    7-Aminoclonazepam <25 Cutoff <25 ng/mL    Alpha-Hydroxyalprazolam <25 Cutoff <25 ng/mL    Alpha-Hydroxymidazolam <25 Cutoff <25 ng/mL    Alprazolam <25 Cutoff <25 ng/mL    Chlordiazepoxide <25 Cutoff <25 ng/mL    Clonazepam <25 Cutoff <25 ng/mL    Diazepam <25 Cutoff <25 ng/mL    Lorazepam <25 Cutoff <25 ng/mL    Midazolam <25 Cutoff <25 ng/mL     Nordiazepam <25 Cutoff <25 ng/mL    Oxazepam <25 Cutoff <25 ng/mL    Temazepam <25 Cutoff <25 ng/mL    Zolpidem <25 Cutoff <25 ng/mL    Zolpidem Metabolite (ZCA) <25 Cutoff <25 ng/mL    6-Acetylmorphine <25 Cutoff <25 ng/mL    Codeine <50 Cutoff <50 ng/mL    Hydrocodone <25 Cutoff <25 ng/mL    Hydromorphone <25 Cutoff <25 ng/mL    Morphine Urine <50 Cutoff <50 ng/mL    Norhydrocodone <25 Cutoff <25 ng/mL    Noroxycodone <25 Cutoff <25 ng/mL    Oxycodone <25 Cutoff <25 ng/mL    Oxymorphone <25 Cutoff <25 ng/mL    Tramadol <50 Cutoff <50 ng/mL    O-Desmethyltramadol <50 Cutoff <50 ng/mL    Fentanyl <2.5 Cutoff<2.5 ng/mL    Norfentanyl <2.5 Cutoff<2.5 ng/mL    METHADONE CONFIRMATION,URINE <25 Cutoff <25 ng/mL    EDDP <25 Cutoff <25 ng/mL     Results are as expected.     Controlled Substance Agreement:  Date of the Last Agreement: 3/22/2023  Reviewed Controlled Substance Agreement including but not limited to the benefits, risks, and alternatives to treatment with a Controlled Substance medication(s).    Opioids:  What is the patient's goal of therapy? Reduce pain (back) on as needed basis  Is this being achieved with current treatment? yes    I have calculated the patient's Morphine Dose Equivalent (MED):   I have considered referral to Pain Management and/or a specialist, and do not feel it is necessary at this time.    I feel that it is clinically indicated to continue this current medication regimen after consideration of alternative therapies, and other non-opioid treatment.    Opioid Risk Screening:  Family History of Substance Abuse  Alcohol: 0 (4/20/2023  3:00 PM)  Illegal Drugs: 0 (4/20/2023  3:00 PM)  Prescription Drugs: 0 (4/20/2023  3:00 PM)    Personal History of Substance Abuse  Alcohol: 0 (4/20/2023  3:00 PM)  Illegal Drugs: 0 (4/20/2023  3:00 PM)  Prescription Drugs: 0 (4/20/2023  3:00 PM)    Patient Age (16-45)  Age (16-45): 0 (4/20/2023  3:00 PM)    History of Preadolescent Sexual Abuse  History of  "Preadolescent Sexual Abuse: .0 (4/20/2023  3:00 PM)    Psychological Disease  Attention Deficit Disorder, Obsessive Compulsive Disorder, Bipolar, Schizophrenia: 0 (4/20/2023  3:00 PM)  Depression: 0 (4/20/2023  3:00 PM)    Total Score  Total Score: 0 (4/20/2023  3:00 PM)    Total Score Risk Category  TOTAL SCORE CATEGORY: Low Risk (0-3) (4/20/2023  3:00 PM)        Pain Assessment:  No data recorded            Review of Systems  Constitutional: Patient denies any fever, chills, loss of appetite, or unexplained weight loss.  Cardiovascular: Patient denies any chest pain, shortness of breath with exertion, tachycardia, palpitations, orthopnea, or paroxysmal nocturnal dyspnea.  Respiratory: Patient denies any cough, shortness breath, or wheezing.  Skin: Denies any rashes or skin lesions.   Neurology: Patient denies any new motor or sensory losses.  Denies any numbness, tingling, weakness, and incoordination of the extremities.  Patient also denies any tremor, seizures, or gait instability.    SEE HISTORY OF PRESENT ILLNESS ALSO.    Objective   /90   Pulse 99   Ht 1.778 m (5' 10\")   Wt 90.4 kg (199 lb 4.8 oz)   SpO2 97%   BMI 28.60 kg/m²     Physical Exam  General Appearance: Alert and cooperative, in no acute distress, well-developed/well-nourished.  Neck: Supple and without adenopathy or rigidity.  There is no JVD at 90° and no carotid bruits are noted.  There is no thyromegaly, thyroid tenderness, or palpable thyroid nodules.  Heart: Regular rate and rhythm without murmur or ectopy.  Respiratory: Lungs are clear to auscultation bilaterally with good air exchange.  Good respiratory effort and no accessory muscle use.  Skin: Good turgor, moist, warm and without rashes or lesions.  Neurological exam: Alert and oriented ×3, no tremor, normal gait.  Extremities: No clubbing, cyanosis, or edema    MS:  No gross bony deformity or joint effusions.  Mild bilat lumbar paraspinal muscle tenderness to palpation.  No " obvious spasm.    Assessment/Plan     1. Chronic bilateral low back pain with left-sided sciatica  Back pain remains persistent but stable.  We will continue the current medications.  Pt has seen pain mgmt who recommended physical therapy.  Pt can not do PT this time of year as he can not take time off work (as a ).  He is hoping to proceed with therapy when the busy season for [employer] work is over.     2. Chronic hand pain, left  Hand pain has improved with the current treatment regimen and we will continue with the same.     3. Chronic hand pain, right  Hand pain has improved with the current treatment regimen and we will continue with the same.     4. HTN  Blood pressure is mildly improved on today's reading.  We will continue with observation for now.  We discussed a goal of achieving a blood pressure of less than 130/80.    5.  COPD   Stable based on symptoms.  Pt to continue the albuterol MDI as needed.    Requested Prescriptions     Signed Prescriptions Disp Refills    etodolac XL (Lodine XL) 400 mg 24 hr tablet 30 tablet 2     Sig: Take 1 tablet (400 mg) by mouth once daily. Take with food    traMADol (Ultram) 50 mg tablet 120 tablet 0     Sig: Take 2 tablets (100 mg) by mouth every 12 hours if needed for severe pain (7 - 10).    gabapentin (Neurontin) 800 mg tablet 90 tablet 2     Sig: Take 1 tablet (800 mg) by mouth 3 times a day.    naloxone (Narcan) 4 mg/0.1 mL nasal spray 2 each 0     Sig: Administer 1 spray (4 mg) into affected nostril(s) if needed for opioid reversal. May repeat every 2-3 minutes if needed, alternating nostrils, until medical assistance becomes available.    amLODIPine (Norvasc) 5 mg tablet 30 tablet 2     Sig: Take 1 tablet (5 mg) by mouth once daily.    albuterol (ProAir HFA) 90 mcg/actuation inhaler 18 g 0     Sig: Inhale 2 puffs every 4 hours if needed for wheezing or shortness of breath.

## 2023-08-22 NOTE — PATIENT INSTRUCTIONS
Follow up in 3 months.    It was a pleasure to see you today. Thank you for choosing us for your health care needs.    If you have lab or other testing completed and have not been informed of results within one week, please call the office for your results.    If you haven't done so, consider signing up for [facility] [employer], the [facility] personal health record. Ask the staff how you can get started.

## 2023-09-25 DIAGNOSIS — G89.29 CHRONIC BILATERAL LOW BACK PAIN WITH LEFT-SIDED SCIATICA: ICD-10-CM

## 2023-09-25 DIAGNOSIS — M54.42 CHRONIC BILATERAL LOW BACK PAIN WITH LEFT-SIDED SCIATICA: ICD-10-CM

## 2023-09-27 DIAGNOSIS — G89.29 CHRONIC BILATERAL LOW BACK PAIN WITH LEFT-SIDED SCIATICA: ICD-10-CM

## 2023-09-27 DIAGNOSIS — M54.42 CHRONIC BILATERAL LOW BACK PAIN WITH LEFT-SIDED SCIATICA: ICD-10-CM

## 2023-09-27 RX ORDER — TRAMADOL HYDROCHLORIDE 50 MG/1
100 TABLET ORAL EVERY 12 HOURS PRN
Qty: 120 TABLET | Refills: 0 | Status: SHIPPED | OUTPATIENT
Start: 2023-09-27 | End: 2023-10-26 | Stop reason: SDUPTHER

## 2023-09-29 RX ORDER — TRAMADOL HYDROCHLORIDE 50 MG/1
TABLET ORAL
Qty: 120 TABLET | Refills: 0 | OUTPATIENT
Start: 2023-09-29

## 2023-10-02 DIAGNOSIS — G89.29 CHRONIC BILATERAL LOW BACK PAIN WITH LEFT-SIDED SCIATICA: ICD-10-CM

## 2023-10-02 DIAGNOSIS — M54.42 CHRONIC BILATERAL LOW BACK PAIN WITH LEFT-SIDED SCIATICA: ICD-10-CM

## 2023-10-05 RX ORDER — TRAMADOL HYDROCHLORIDE 50 MG/1
100 TABLET ORAL EVERY 12 HOURS PRN
Qty: 120 TABLET | Refills: 0 | OUTPATIENT
Start: 2023-10-05

## 2023-10-26 DIAGNOSIS — G89.29 CHRONIC BILATERAL LOW BACK PAIN WITH LEFT-SIDED SCIATICA: ICD-10-CM

## 2023-10-26 DIAGNOSIS — M54.42 CHRONIC BILATERAL LOW BACK PAIN WITH LEFT-SIDED SCIATICA: ICD-10-CM

## 2023-10-29 RX ORDER — TRAMADOL HYDROCHLORIDE 50 MG/1
100 TABLET ORAL EVERY 12 HOURS PRN
Qty: 120 TABLET | Refills: 0 | Status: SHIPPED | OUTPATIENT
Start: 2023-10-29 | End: 2023-11-22 | Stop reason: SDUPTHER

## 2023-11-22 ENCOUNTER — OFFICE VISIT (OUTPATIENT)
Dept: PRIMARY CARE | Facility: CLINIC | Age: 53
End: 2023-11-22
Payer: COMMERCIAL

## 2023-11-22 VITALS
OXYGEN SATURATION: 95 % | DIASTOLIC BLOOD PRESSURE: 90 MMHG | TEMPERATURE: 97.2 F | HEIGHT: 70 IN | WEIGHT: 200.1 LBS | SYSTOLIC BLOOD PRESSURE: 140 MMHG | HEART RATE: 90 BPM | BODY MASS INDEX: 28.65 KG/M2

## 2023-11-22 DIAGNOSIS — Z23 NEED FOR SHINGLES VACCINE: ICD-10-CM

## 2023-11-22 DIAGNOSIS — Z23 ENCOUNTER FOR IMMUNIZATION: ICD-10-CM

## 2023-11-22 DIAGNOSIS — M54.42 CHRONIC BILATERAL LOW BACK PAIN WITH LEFT-SIDED SCIATICA: ICD-10-CM

## 2023-11-22 DIAGNOSIS — Z12.5 PROSTATE CANCER SCREENING: ICD-10-CM

## 2023-11-22 DIAGNOSIS — J44.9 CHRONIC OBSTRUCTIVE PULMONARY DISEASE, UNSPECIFIED COPD TYPE (MULTI): ICD-10-CM

## 2023-11-22 DIAGNOSIS — I10 PRIMARY HYPERTENSION: Primary | ICD-10-CM

## 2023-11-22 DIAGNOSIS — G89.29 CHRONIC BILATERAL LOW BACK PAIN WITH LEFT-SIDED SCIATICA: ICD-10-CM

## 2023-11-22 PROBLEM — M96.1 POSTLAMINECTOMY SYNDROME OF LUMBAR REGION: Status: ACTIVE | Noted: 2022-11-16

## 2023-11-22 PROCEDURE — 3080F DIAST BP >= 90 MM HG: CPT | Performed by: FAMILY MEDICINE

## 2023-11-22 PROCEDURE — 90750 HZV VACC RECOMBINANT IM: CPT | Performed by: FAMILY MEDICINE

## 2023-11-22 PROCEDURE — 99214 OFFICE O/P EST MOD 30 MIN: CPT | Performed by: FAMILY MEDICINE

## 2023-11-22 PROCEDURE — 90472 IMMUNIZATION ADMIN EACH ADD: CPT | Performed by: FAMILY MEDICINE

## 2023-11-22 PROCEDURE — 90471 IMMUNIZATION ADMIN: CPT | Performed by: FAMILY MEDICINE

## 2023-11-22 PROCEDURE — 90686 IIV4 VACC NO PRSV 0.5 ML IM: CPT | Performed by: FAMILY MEDICINE

## 2023-11-22 PROCEDURE — 3077F SYST BP >= 140 MM HG: CPT | Performed by: FAMILY MEDICINE

## 2023-11-22 RX ORDER — ALBUTEROL SULFATE 90 UG/1
2 AEROSOL, METERED RESPIRATORY (INHALATION) EVERY 4 HOURS PRN
Qty: 18 G | Refills: 0 | Status: SHIPPED | OUTPATIENT
Start: 2023-11-22 | End: 2024-06-05 | Stop reason: SDUPTHER

## 2023-11-22 RX ORDER — ETODOLAC 400 MG/1
400 TABLET, EXTENDED RELEASE ORAL DAILY
Qty: 30 TABLET | Refills: 2 | Status: SHIPPED | OUTPATIENT
Start: 2023-11-22 | End: 2024-06-05 | Stop reason: SDUPTHER

## 2023-11-22 RX ORDER — AMLODIPINE AND BENAZEPRIL HYDROCHLORIDE 5; 10 MG/1; MG/1
1 CAPSULE ORAL DAILY
Qty: 90 CAPSULE | Refills: 0 | Status: SHIPPED | OUTPATIENT
Start: 2023-11-22 | End: 2024-02-22 | Stop reason: SDUPTHER

## 2023-11-22 RX ORDER — TRAMADOL HYDROCHLORIDE 50 MG/1
100 TABLET ORAL EVERY 12 HOURS PRN
Qty: 120 TABLET | Refills: 0 | Status: SHIPPED | OUTPATIENT
Start: 2023-11-22 | End: 2024-01-09 | Stop reason: SDUPTHER

## 2023-11-22 RX ORDER — GABAPENTIN 800 MG/1
800 TABLET ORAL 3 TIMES DAILY
Qty: 90 TABLET | Refills: 2 | Status: SHIPPED | OUTPATIENT
Start: 2023-11-22 | End: 2024-02-22 | Stop reason: SDUPTHER

## 2023-11-22 RX ORDER — AMLODIPINE BESYLATE 5 MG/1
5 TABLET ORAL DAILY
Qty: 30 TABLET | Refills: 2 | Status: CANCELLED | OUTPATIENT
Start: 2023-11-22

## 2023-11-22 NOTE — PROGRESS NOTES
Subjective   Patient ID: Kobe Cisneros is a 53 y.o. male who presents for 3 month Follow-up.    HPI     No new concerns     Pt interested in flu shot    Pt reports back pain is unchanged.  Still adequately controlled with the current medications.    The pain has not been radiating and is more localized to jus the lower back.  He does have a hx of past lumbar surgery.  Patient denies any lower extremity weakness or incoordination.  He denies any saddle anesthesia.  He denies any bowel or bladder dysfunction.       OARRS:  Francisco Ashford,  on 11/22/2023  4:23 PM  I have personally reviewed the OARRS report for Kobe Cisneros. I have considered the risks of abuse, dependence, addiction and diversion and I believe that it is clinically appropriate for Kobe Cisneros to be prescribed this medication    Is the patient prescribed a combination of a benzodiazepine and opioid?  No    Last Urine Drug Screen / ordered today: No  Recent Results (from the past 8760 hour(s))   OPIATE/OPIOID/BENZO PRESCRIPTION COMPLIANCE    Collection Time: 01/23/23  5:54 PM   Result Value Ref Range    DRUG SCREEN COMMENT URINE SEE BELOW     Creatine, Urine 175.1 mg/dL    Amphetamine Screen, Urine PRESUMPTIVE NEGATIVE NEGATIVE    Barbiturate Screen, Urine PRESUMPTIVE NEGATIVE NEGATIVE    Cannabinoid Screen, Urine PRESUMPTIVE NEGATIVE NEGATIVE    Cocaine Screen, Urine PRESUMPTIVE NEGATIVE NEGATIVE    PCP Screen, Urine PRESUMPTIVE NEGATIVE NEGATIVE    7-Aminoclonazepam <25 Cutoff <25 ng/mL    Alpha-Hydroxyalprazolam <25 Cutoff <25 ng/mL    Alpha-Hydroxymidazolam <25 Cutoff <25 ng/mL    Alprazolam <25 Cutoff <25 ng/mL    Chlordiazepoxide <25 Cutoff <25 ng/mL    Clonazepam <25 Cutoff <25 ng/mL    Diazepam <25 Cutoff <25 ng/mL    Lorazepam <25 Cutoff <25 ng/mL    Midazolam <25 Cutoff <25 ng/mL    Nordiazepam <25 Cutoff <25 ng/mL    Oxazepam <25 Cutoff <25 ng/mL    Temazepam <25 Cutoff <25 ng/mL    Zolpidem <25 Cutoff <25 ng/mL    Zolpidem  Metabolite (ZCA) <25 Cutoff <25 ng/mL    6-Acetylmorphine <25 Cutoff <25 ng/mL    Codeine <50 Cutoff <50 ng/mL    Hydrocodone <25 Cutoff <25 ng/mL    Hydromorphone <25 Cutoff <25 ng/mL    Morphine Urine <50 Cutoff <50 ng/mL    Norhydrocodone <25 Cutoff <25 ng/mL    Noroxycodone <25 Cutoff <25 ng/mL    Oxycodone <25 Cutoff <25 ng/mL    Oxymorphone <25 Cutoff <25 ng/mL    Tramadol <50 Cutoff <50 ng/mL    O-Desmethyltramadol <50 Cutoff <50 ng/mL    Fentanyl <2.5 Cutoff<2.5 ng/mL    Norfentanyl <2.5 Cutoff<2.5 ng/mL    METHADONE CONFIRMATION,URINE <25 Cutoff <25 ng/mL    EDDP <25 Cutoff <25 ng/mL     Results are as expected.         Controlled Substance Agreement:  Date of the Last Agreement: 1/18/2023  Reviewed Controlled Substance Agreement including but not limited to the benefits, risks, and alternatives to treatment with a Controlled Substance medication(s).    Opioids:  What is the patient's goal of therapy? Reduce pain as needed  Is this being achieved with current treatment? yes    I have calculated the patient's Morphine Dose Equivalent (MED):   I have considered referral to Pain Management and/or a specialist, and do not feel it is necessary at this time.    I feel that it is clinically indicated to continue this current medication regimen after consideration of alternative therapies, and other non-opioid treatment.    Opioid Risk Screening:  Family History of Substance Abuse  Alcohol: 0 (4/20/2023  3:00 PM)  Illegal Drugs: 0 (4/20/2023  3:00 PM)  Prescription Drugs: 0 (4/20/2023  3:00 PM)    Personal History of Substance Abuse  Alcohol: 0 (4/20/2023  3:00 PM)  Illegal Drugs: 0 (4/20/2023  3:00 PM)  Prescription Drugs: 0 (4/20/2023  3:00 PM)    Patient Age (16-45)  Age (16-45): 0 (4/20/2023  3:00 PM)    History of Preadolescent Sexual Abuse  History of Preadolescent Sexual Abuse: .0 (4/20/2023  3:00 PM)    Psychological Disease  Attention Deficit Disorder, Obsessive Compulsive Disorder, Bipolar, Schizophrenia:  "0 (4/20/2023  3:00 PM)  Depression: 0 (4/20/2023  3:00 PM)    Total Score  Total Score: 0 (4/20/2023  3:00 PM)    Total Score Risk Category  TOTAL SCORE CATEGORY: Low Risk (0-3) (4/20/2023  3:00 PM)        Pain Assessment:  Analgesia  What was your pain level on average during the past week?: 4  What was your pain level at its worst during the past week?: 8  What percentage of your pain has been relieved during the past week?: 45 %  Is the amount of pain relief you are now obtaining from your current pain relievers enough to make a real difference in your life?: Y  Query to Clinician: Is the patient's pain relief clinically significant?: Yes    Activities of Daily Living  Physical Functioning: Same  Family Relationships: Same  Social Relationships: Same  Mood: Same  Sleep Patterns: Same    Adverse Events  Is patient experiencing any side effects from current pain relievers?: N  Patient's Overall Severity of Side Effects: None      Assessment  Is your overall impression that this patient is benefiting from opioid therapy?: Yes  Specific Analgesic Plan: Continue present regimen            Review of Systems  Constitutional: Patient denies any fever, chills, loss of appetite, or unexplained weight loss.  Cardiovascular: Patient denies any chest pain, shortness of breath with exertion, tachycardia, palpitations, orthopnea, or paroxysmal nocturnal dyspnea.  Respiratory: Patient denies any cough, shortness breath, or wheezing.  Skin: Denies any rashes or skin lesions.   Neurology: Patient denies any new motor or sensory losses.  Denies any numbness, tingling, weakness, and incoordination of the extremities.  Patient also denies any tremor, seizures, or gait instability.  Endocrinology: Denies any polyuria, polydipsia, polyphagia, or heat/cold intolerance.    See HPI also otherwise review of systems is negative today  Objective   /90   Pulse 90   Temp 36.2 °C (97.2 °F)   Ht 1.778 m (5' 10\")   Wt 90.8 kg (200 lb " 1.6 oz)   SpO2 95%   BMI 28.71 kg/m²     Physical Exam  General Appearance: Alert and cooperative, in no acute distress, well-developed/well-nourished male.  Neck: Supple and without adenopathy or rigidity.  There is no JVD at 90° and no carotid bruits are noted.  There is no thyromegaly, thyroid tenderness, or palpable thyroid nodules.  Heart: Regular rate and rhythm without murmur or ectopy.  Respiratory: Lungs are clear to auscultation bilaterally with good air exchange.  Good respiratory effort and no accessory muscle use.  Skin: Good turgor, moist, warm and without rashes or lesions.  Neurological exam: Alert and oriented ×3, no tremor, normal gait.  Extremities: No clubbing, cyanosis, or edema    Assessment/Plan     HTN  Blood pressure is still elevated.  11/22/2023: Changed amlodipine to Lotrel to further address the blood pressure.  Should have a metabolic panel in 2 weeks.    Chronic bilateral low back pain with left-sided sciatica  Back pain remains persistent but stable.  We will continue the current medications.  Pt has seen pain mgmt who recommended physical therapy.  Pt can not do PT this time of year as he can not take time off work (as a ).  He is hoping to proceed with therapy when the busy season for [employer]erson work is over.     Chronic hand pain, left  Hand pain has improved with the current treatment regimen and we will continue with the same.     Chronic hand pain, right  Hand pain has improved with the current treatment regimen and we will continue with the same.     COPD   Stable based on symptoms.  Pt to continue the albuterol MDI as needed.        Orders Placed This Encounter   Procedures    Flu vaccine (IIV4) age 6 months and greater, preservative free    Zoster vaccine, recombinant, adult (SHINGRIX)    Basic Metabolic Panel    Comprehensive Metabolic Panel    Lipid Panel    Prostate Specific Antigen, Screen     Requested Prescriptions     Signed Prescriptions Disp Refills    etodolac  XL (Lodine XL) 400 mg 24 hr tablet 30 tablet 2     Sig: Take 1 tablet (400 mg) by mouth once daily. Take with food    gabapentin (Neurontin) 800 mg tablet 90 tablet 2     Sig: Take 1 tablet (800 mg) by mouth 3 times a day.    traMADol (Ultram) 50 mg tablet 120 tablet 0     Sig: Take 2 tablets (100 mg) by mouth every 12 hours if needed for severe pain (7 - 10).    albuterol (ProAir HFA) 90 mcg/actuation inhaler 18 g 0     Sig: Inhale 2 puffs every 4 hours if needed for wheezing or shortness of breath.    amLODIPine-benazepriL (Lotrel) 5-10 mg capsule 90 capsule 0     Sig: Take 1 capsule by mouth once daily.

## 2023-11-22 NOTE — PATIENT INSTRUCTIONS
Start the new BP medication as discussed.    Follow up in 3 months with labs to be done PRIOR.    It was a pleasure to see you today. Thank you for choosing us for your health care needs.    If you have lab or other testing completed and have not been informed of results within one week, please call the office for your results.    If you haven't done so, consider signing up for [facility] [email or web address], the [facility] personal health record. Ask the staff how you can get started.

## 2024-01-09 DIAGNOSIS — G89.29 CHRONIC BILATERAL LOW BACK PAIN WITH LEFT-SIDED SCIATICA: ICD-10-CM

## 2024-01-09 DIAGNOSIS — M54.42 CHRONIC BILATERAL LOW BACK PAIN WITH LEFT-SIDED SCIATICA: ICD-10-CM

## 2024-01-12 RX ORDER — TRAMADOL HYDROCHLORIDE 50 MG/1
100 TABLET ORAL EVERY 12 HOURS PRN
Qty: 120 TABLET | Refills: 0 | Status: SHIPPED | OUTPATIENT
Start: 2024-01-12 | End: 2024-02-05 | Stop reason: SDUPTHER

## 2024-02-05 DIAGNOSIS — G89.29 CHRONIC BILATERAL LOW BACK PAIN WITH LEFT-SIDED SCIATICA: ICD-10-CM

## 2024-02-05 DIAGNOSIS — M54.42 CHRONIC BILATERAL LOW BACK PAIN WITH LEFT-SIDED SCIATICA: ICD-10-CM

## 2024-02-09 RX ORDER — TRAMADOL HYDROCHLORIDE 50 MG/1
100 TABLET ORAL EVERY 12 HOURS PRN
Qty: 120 TABLET | Refills: 0 | Status: SHIPPED | OUTPATIENT
Start: 2024-02-09 | End: 2024-03-05 | Stop reason: SDUPTHER

## 2024-02-21 ENCOUNTER — LAB (OUTPATIENT)
Dept: LAB | Facility: LAB | Age: 54
End: 2024-02-21
Payer: COMMERCIAL

## 2024-02-21 DIAGNOSIS — I10 PRIMARY HYPERTENSION: ICD-10-CM

## 2024-02-21 DIAGNOSIS — Z12.5 PROSTATE CANCER SCREENING: ICD-10-CM

## 2024-02-21 LAB
ALBUMIN SERPL BCP-MCNC: 4.5 G/DL (ref 3.4–5)
ALP SERPL-CCNC: 82 U/L (ref 33–120)
ALT SERPL W P-5'-P-CCNC: 26 U/L (ref 10–52)
ANION GAP SERPL CALC-SCNC: 15 MMOL/L (ref 10–20)
AST SERPL W P-5'-P-CCNC: 20 U/L (ref 9–39)
BILIRUB SERPL-MCNC: 0.8 MG/DL (ref 0–1.2)
BUN SERPL-MCNC: 12 MG/DL (ref 6–23)
CALCIUM SERPL-MCNC: 9.7 MG/DL (ref 8.6–10.3)
CHLORIDE SERPL-SCNC: 102 MMOL/L (ref 98–107)
CHOLEST SERPL-MCNC: 254 MG/DL (ref 0–199)
CHOLESTEROL/HDL RATIO: 2.6
CO2 SERPL-SCNC: 24 MMOL/L (ref 21–32)
CREAT SERPL-MCNC: 1.03 MG/DL (ref 0.5–1.3)
EGFRCR SERPLBLD CKD-EPI 2021: 88 ML/MIN/1.73M*2
GLUCOSE SERPL-MCNC: 124 MG/DL (ref 74–99)
HDLC SERPL-MCNC: 97.4 MG/DL
LDLC SERPL CALC-MCNC: 147 MG/DL
NON HDL CHOLESTEROL: 157 MG/DL (ref 0–149)
POTASSIUM SERPL-SCNC: 4.7 MMOL/L (ref 3.5–5.3)
PROT SERPL-MCNC: 7 G/DL (ref 6.4–8.2)
PSA SERPL-MCNC: 2.29 NG/ML
SODIUM SERPL-SCNC: 136 MMOL/L (ref 136–145)
TRIGL SERPL-MCNC: 48 MG/DL (ref 0–149)
VLDL: 10 MG/DL (ref 0–40)

## 2024-02-21 PROCEDURE — 36415 COLL VENOUS BLD VENIPUNCTURE: CPT

## 2024-02-21 PROCEDURE — 80053 COMPREHEN METABOLIC PANEL: CPT

## 2024-02-21 PROCEDURE — 80061 LIPID PANEL: CPT

## 2024-02-21 PROCEDURE — 84153 ASSAY OF PSA TOTAL: CPT

## 2024-02-22 ENCOUNTER — OFFICE VISIT (OUTPATIENT)
Dept: PRIMARY CARE | Facility: CLINIC | Age: 54
End: 2024-02-22
Payer: COMMERCIAL

## 2024-02-22 VITALS
WEIGHT: 199.6 LBS | HEIGHT: 70 IN | HEART RATE: 85 BPM | OXYGEN SATURATION: 93 % | BODY MASS INDEX: 28.58 KG/M2 | TEMPERATURE: 98.7 F | SYSTOLIC BLOOD PRESSURE: 128 MMHG | DIASTOLIC BLOOD PRESSURE: 86 MMHG

## 2024-02-22 DIAGNOSIS — Z79.899 HIGH RISK MEDICATION USE: ICD-10-CM

## 2024-02-22 DIAGNOSIS — M54.42 CHRONIC BILATERAL LOW BACK PAIN WITH LEFT-SIDED SCIATICA: ICD-10-CM

## 2024-02-22 DIAGNOSIS — I10 PRIMARY HYPERTENSION: Primary | ICD-10-CM

## 2024-02-22 DIAGNOSIS — Z23 NEED FOR VACCINATION: ICD-10-CM

## 2024-02-22 DIAGNOSIS — R97.20 INCREASED PROSTATE SPECIFIC ANTIGEN (PSA) VELOCITY: ICD-10-CM

## 2024-02-22 DIAGNOSIS — G89.29 CHRONIC BILATERAL LOW BACK PAIN WITH LEFT-SIDED SCIATICA: ICD-10-CM

## 2024-02-22 DIAGNOSIS — M79.641 CHRONIC HAND PAIN, RIGHT: ICD-10-CM

## 2024-02-22 DIAGNOSIS — G89.29 CHRONIC HAND PAIN, RIGHT: ICD-10-CM

## 2024-02-22 DIAGNOSIS — J44.9 CHRONIC OBSTRUCTIVE PULMONARY DISEASE, UNSPECIFIED COPD TYPE (MULTI): ICD-10-CM

## 2024-02-22 PROCEDURE — 80365 DRUG SCREENING OXYCODONE: CPT

## 2024-02-22 PROCEDURE — 3074F SYST BP LT 130 MM HG: CPT | Performed by: FAMILY MEDICINE

## 2024-02-22 PROCEDURE — 80346 BENZODIAZEPINES1-12: CPT

## 2024-02-22 PROCEDURE — 3079F DIAST BP 80-89 MM HG: CPT | Performed by: FAMILY MEDICINE

## 2024-02-22 PROCEDURE — 80368 SEDATIVE HYPNOTICS: CPT

## 2024-02-22 PROCEDURE — 80373 DRUG SCREENING TRAMADOL: CPT

## 2024-02-22 PROCEDURE — 80354 DRUG SCREENING FENTANYL: CPT

## 2024-02-22 PROCEDURE — 80361 OPIATES 1 OR MORE: CPT

## 2024-02-22 PROCEDURE — 90715 TDAP VACCINE 7 YRS/> IM: CPT | Performed by: FAMILY MEDICINE

## 2024-02-22 PROCEDURE — 80358 DRUG SCREENING METHADONE: CPT

## 2024-02-22 PROCEDURE — 99214 OFFICE O/P EST MOD 30 MIN: CPT | Performed by: FAMILY MEDICINE

## 2024-02-22 PROCEDURE — 90471 IMMUNIZATION ADMIN: CPT | Performed by: FAMILY MEDICINE

## 2024-02-22 RX ORDER — GABAPENTIN 800 MG/1
800 TABLET ORAL 3 TIMES DAILY
Qty: 90 TABLET | Refills: 2 | Status: SHIPPED | OUTPATIENT
Start: 2024-02-22 | End: 2024-06-05 | Stop reason: SDUPTHER

## 2024-02-22 RX ORDER — AMLODIPINE AND BENAZEPRIL HYDROCHLORIDE 5; 10 MG/1; MG/1
1 CAPSULE ORAL DAILY
Qty: 90 CAPSULE | Refills: 0 | Status: SHIPPED | OUTPATIENT
Start: 2024-02-22 | End: 2024-06-05 | Stop reason: SDUPTHER

## 2024-02-22 ASSESSMENT — PATIENT HEALTH QUESTIONNAIRE - PHQ9
1. LITTLE INTEREST OR PLEASURE IN DOING THINGS: NOT AT ALL
SUM OF ALL RESPONSES TO PHQ9 QUESTIONS 1 AND 2: 0
2. FEELING DOWN, DEPRESSED OR HOPELESS: NOT AT ALL

## 2024-02-22 NOTE — PROGRESS NOTES
Subjective   Patient ID: Kobe Cisneros is a 53 y.o. male who presents for Follow-up.    HPI     No new concerns   Labs: 01/21/2024    Patient has hypertension.  Patient does not monitor BP at home.    Denies CP, SOB, dizziness, and LE edema.    Patient is compliant with antihypertensive therapy and denies any noted side effects.      He has chronic back pain.   Pt reports back pain is unchanged.  Still adequately controlled with the current medications.     The pain has not been radiating and is more localized to the lower back.  He does have a hx of past lumbar surgery.  Patient denies any lower extremity weakness or incoordination.  He denies any saddle anesthesia.  He denies any bowel or bladder dysfunction.     He has COPD.  No exacerbations.  Uses albuterol as needed.  Continues to smoke.    Has chronic bilat hand pain.  Pain is persistent but stable at this time.     Would like tetanus shot       OARRS:  Francisco Ashford,  on 2/22/2024  8:18 AM  I have personally reviewed the OARRS report for Kobe Cisneros. I have considered the risks of abuse, dependence, addiction and diversion and I believe that it is clinically appropriate for Kobe Cisneros to be prescribed this medication    Is the patient prescribed a combination of a benzodiazepine and opioid?  No    Last Urine Drug Screen / ordered today: Yes  No results found for this or any previous visit (from the past 8760 hour(s)).  LAST URINE DRUG SCREEN DONE 1/23/2023 AND RESULTS ARE IN HIS CHART AND WERE REVIEWED.    Controlled Substance Agreement:  Date of the Last Agreement: 2/22/2024  Reviewed Controlled Substance Agreement including but not limited to the benefits, risks, and alternatives to treatment with a Controlled Substance medication(s).    Opioids:  What is the patient's goal of therapy? Reduce pain in as needed basis  Is this being achieved with current treatment? yes    I have calculated the patient's Morphine Dose Equivalent (MED):   I  have considered referral to Pain Management and/or a specialist, and do not feel it is necessary at this time.    I feel that it is clinically indicated to continue this current medication regimen after consideration of alternative therapies, and other non-opioid treatment.    Opioid Risk Screening:  Family History of Substance Abuse  Alcohol: 0 (4/20/2023  3:00 PM)  Illegal Drugs: 0 (4/20/2023  3:00 PM)  Prescription Drugs: 0 (4/20/2023  3:00 PM)    Personal History of Substance Abuse  Alcohol: 0 (4/20/2023  3:00 PM)  Illegal Drugs: 0 (4/20/2023  3:00 PM)  Prescription Drugs: 0 (4/20/2023  3:00 PM)    Patient Age (16-45)  Age (16-45): 0 (4/20/2023  3:00 PM)    History of Preadolescent Sexual Abuse  History of Preadolescent Sexual Abuse: .0 (4/20/2023  3:00 PM)    Psychological Disease  Attention Deficit Disorder, Obsessive Compulsive Disorder, Bipolar, Schizophrenia: 0 (4/20/2023  3:00 PM)  Depression: 0 (4/20/2023  3:00 PM)    Total Score  Total Score: 0 (4/20/2023  3:00 PM)    Total Score Risk Category  TOTAL SCORE CATEGORY: Low Risk (0-3) (4/20/2023  3:00 PM)        Pain Assessment:  Analgesia  What was your pain level on average during the past week?: 4  What was your pain level at its worst during the past week?: 8  What percentage of your pain has been relieved during the past week?: 45 %  Is the amount of pain relief you are now obtaining from your current pain relievers enough to make a real difference in your life?: Y  Query to Clinician: Is the patient's pain relief clinically significant?: Yes    Activities of Daily Living  Physical Functioning: Same  Family Relationships: Same  Social Relationships: Same  Mood: Same  Sleep Patterns: Same  Overall Functioning: Same    Adverse Events  Is patient experiencing any side effects from current pain relievers?: N  Patient's Overall Severity of Side Effects: None      Assessment  Is your overall impression that this patient is benefiting from opioid therapy?:  "Yes  Specific Analgesic Plan: Continue present regimen        Review of Systems  Constitutional: Patient denies any fever, chills, loss of appetite, or unexplained weight loss.  Cardiovascular: Patient denies any chest pain, shortness of breath with exertion, tachycardia, palpitations, orthopnea, or paroxysmal nocturnal dyspnea.  Respiratory: Patient denies any cough, shortness breath, or wheezing.  Skin: Denies any rashes or skin lesions.   Neurology: Patient denies any new motor or sensory losses.  Denies any numbness, tingling, weakness, and incoordination of the extremities.  Patient also denies any tremor, seizures, or gait instability.  Endocrinology: Denies any polyuria, polydipsia, polyphagia, or heat/cold intolerance.    MS: Has chronic back and hand pain (unchanged)    Objective   /86   Pulse 85   Temp 37.1 °C (98.7 °F)   Ht 1.778 m (5' 10\")   Wt 90.5 kg (199 lb 9.6 oz)   SpO2 93%   BMI 28.64 kg/m²     Physical Exam  General Appearance: Alert and cooperative, in no acute distress, well-developed/well-nourished.  Neck: Supple and without adenopathy or rigidity.  There is no JVD at 90° and no carotid bruits are noted.  There is no thyromegaly, thyroid tenderness, or palpable thyroid nodules.  Heart: Regular rate and rhythm without murmur or ectopy.  Respiratory: Lungs are clear to auscultation bilaterally with good air exchange.  Good respiratory effort and no accessory muscle use.  Skin: Good turgor, moist, warm and without rashes or lesions.  Neurological exam: Alert and oriented ×3, no tremor, normal gait.  Extremities: No clubbing, cyanosis, or edema    MS:  SLR neg bilate  Mild bilat lumbar paraspinal tenderness    Assessment/Plan     HTN  Blood pressure is still elevated.  11/22/2023: Changed amlodipine to Lotrel to further address the blood pressure.  Should have a metabolic panel in 2 weeks.    The 10-year ASCVD risk score (Serenity TALBOT, et al., 2019) is: 6.7%    Values used to calculate the " score:      Age: 53 years      Sex: Male      Is Non- : No      Diabetic: No      Tobacco smoker: Yes      Systolic Blood Pressure: 128 mmHg      Is BP treated: Yes      HDL Cholesterol: 97.4 mg/dL      Total Cholesterol: 254 mg/dL           Chronic bilateral low back pain with left-sided sciatica  Back pain remains persistent but stable.  We will continue the current medications.  Pt has seen pain mgmt who recommended physical therapy.  Pt can not do PT this time as he can not take time off work (as a ).  He is hoping to proceed with therapy when the busy season for [employer] work is over.  With the mild Winter, work has not slowed down much.     Chronic hand pain, left  Hand pain has improved with the current treatment regimen and we will continue with the same.     Chronic hand pain, right  Hand pain has improved with the current treatment regimen and we will continue with the same.     COPD   Stable based on symptoms.  Pt to continue the albuterol MDI as needed.     Increased PSA velocity:  Discussed referral to urology for evaluation or repeating PSA.  PSA increased to 2.29 from 0.77.  He prefers to repeat in 6 months.     Need for tetanus vaccine:  Tdap administered today.         Orders Placed This Encounter   Procedures    Tdap vaccine, age 7 years and older  (BOOSTRIX)    Opiate/Opioid/Benzo Prescription Compliance    Screen Opiate/Opioid/Benzo Prescription Compliance    Confirmation Opiate/Opioid/Benzo Prescription Compliance    OOB Internal Tracking     Requested Prescriptions     Signed Prescriptions Disp Refills    gabapentin (Neurontin) 800 mg tablet 90 tablet 2     Sig: Take 1 tablet (800 mg) by mouth 3 times a day.    amLODIPine-benazepriL (Lotrel) 5-10 mg capsule 90 capsule 0     Sig: Take 1 capsule by mouth once daily.

## 2024-02-22 NOTE — PATIENT INSTRUCTIONS
WE WILL RECHECK THE PSA IN 6 MONTHS SINCE IT INCREASED MORE THAN ANTICIPATED.  THINGS TO AVOID 1 WEEK BEFORE THE NEXT PSA LAB:  SEX, SPICY FOODS AND BOUNCING WHILE SEATED    Follow up in 3 months.    It was a pleasure to see you today. Thank you for choosing us for your health care needs.    If you have lab or other testing completed and have not been informed of results within one week, please call the office for your results.    If you haven't done so, consider signing up for [facility] [employer], the [facility] personal health record. Ask the staff how you can get started.    
Clear

## 2024-02-29 LAB
1OH-MIDAZOLAM UR CFM-MCNC: <25 NG/ML
6MAM UR CFM-MCNC: <25 NG/ML
7AMINOCLONAZEPAM UR CFM-MCNC: <25 NG/ML
A-OH ALPRAZ UR CFM-MCNC: <25 NG/ML
ALPRAZ UR CFM-MCNC: <25 NG/ML
CHLORDIAZEP UR CFM-MCNC: <25 NG/ML
CLONAZEPAM UR CFM-MCNC: <25 NG/ML
CODEINE UR CFM-MCNC: <50 NG/ML
DIAZEPAM UR CFM-MCNC: <25 NG/ML
EDDP UR CFM-MCNC: <25 NG/ML
FENTANYL UR CFM-MCNC: <2.5 NG/ML
HYDROCODONE CTO UR CFM-MCNC: <25 NG/ML
HYDROMORPHONE UR CFM-MCNC: <25 NG/ML
LORAZEPAM UR CFM-MCNC: <25 NG/ML
METHADONE UR CFM-MCNC: <25 NG/ML
MIDAZOLAM UR CFM-MCNC: <25 NG/ML
MORPHINE UR CFM-MCNC: <50 NG/ML
NORDIAZEPAM UR CFM-MCNC: <25 NG/ML
NORFENTANYL UR CFM-MCNC: <2.5 NG/ML
NORHYDROCODONE UR CFM-MCNC: <25 NG/ML
NOROXYCODONE UR CFM-MCNC: <25 NG/ML
NORTRAMADOL UR-MCNC: >1000 NG/ML
OXAZEPAM UR CFM-MCNC: <25 NG/ML
OXYCODONE UR CFM-MCNC: <25 NG/ML
OXYMORPHONE UR CFM-MCNC: <25 NG/ML
TEMAZEPAM UR CFM-MCNC: <25 NG/ML
TRAMADOL UR CFM-MCNC: >1000 NG/ML
ZOLPIDEM UR CFM-MCNC: <25 NG/ML
ZOLPIDEM UR-MCNC: <25 NG/ML

## 2024-03-05 DIAGNOSIS — M54.42 CHRONIC BILATERAL LOW BACK PAIN WITH LEFT-SIDED SCIATICA: ICD-10-CM

## 2024-03-05 DIAGNOSIS — G89.29 CHRONIC BILATERAL LOW BACK PAIN WITH LEFT-SIDED SCIATICA: ICD-10-CM

## 2024-03-05 RX ORDER — TRAMADOL HYDROCHLORIDE 50 MG/1
100 TABLET ORAL EVERY 12 HOURS PRN
Qty: 120 TABLET | Refills: 0 | Status: SHIPPED | OUTPATIENT
Start: 2024-03-05 | End: 2024-04-03 | Stop reason: SDUPTHER

## 2024-04-03 DIAGNOSIS — G89.29 CHRONIC BILATERAL LOW BACK PAIN WITH LEFT-SIDED SCIATICA: ICD-10-CM

## 2024-04-03 DIAGNOSIS — M54.42 CHRONIC BILATERAL LOW BACK PAIN WITH LEFT-SIDED SCIATICA: ICD-10-CM

## 2024-04-03 RX ORDER — TRAMADOL HYDROCHLORIDE 50 MG/1
100 TABLET ORAL EVERY 12 HOURS PRN
Qty: 120 TABLET | Refills: 0 | Status: SHIPPED | OUTPATIENT
Start: 2024-04-05 | End: 2024-05-08 | Stop reason: SDUPTHER

## 2024-05-08 DIAGNOSIS — G89.29 CHRONIC BILATERAL LOW BACK PAIN WITH LEFT-SIDED SCIATICA: ICD-10-CM

## 2024-05-08 DIAGNOSIS — M54.42 CHRONIC BILATERAL LOW BACK PAIN WITH LEFT-SIDED SCIATICA: ICD-10-CM

## 2024-05-08 RX ORDER — TRAMADOL HYDROCHLORIDE 50 MG/1
100 TABLET ORAL EVERY 12 HOURS PRN
Qty: 120 TABLET | Refills: 0 | Status: SHIPPED | OUTPATIENT
Start: 2024-05-08 | End: 2024-06-05 | Stop reason: SDUPTHER

## 2024-06-05 ENCOUNTER — OFFICE VISIT (OUTPATIENT)
Dept: PRIMARY CARE | Facility: CLINIC | Age: 54
End: 2024-06-05
Payer: COMMERCIAL

## 2024-06-05 VITALS
HEART RATE: 86 BPM | SYSTOLIC BLOOD PRESSURE: 132 MMHG | DIASTOLIC BLOOD PRESSURE: 84 MMHG | TEMPERATURE: 97.3 F | HEIGHT: 70 IN | OXYGEN SATURATION: 96 % | BODY MASS INDEX: 28 KG/M2 | WEIGHT: 195.6 LBS

## 2024-06-05 DIAGNOSIS — G89.29 CHRONIC HAND PAIN, LEFT: ICD-10-CM

## 2024-06-05 DIAGNOSIS — Z23 NEED FOR SHINGLES VACCINE: ICD-10-CM

## 2024-06-05 DIAGNOSIS — G89.29 CHRONIC BILATERAL LOW BACK PAIN WITH LEFT-SIDED SCIATICA: ICD-10-CM

## 2024-06-05 DIAGNOSIS — R97.20 INCREASED PROSTATE SPECIFIC ANTIGEN (PSA) VELOCITY: ICD-10-CM

## 2024-06-05 DIAGNOSIS — J44.9 CHRONIC OBSTRUCTIVE PULMONARY DISEASE, UNSPECIFIED COPD TYPE (MULTI): ICD-10-CM

## 2024-06-05 DIAGNOSIS — S46.812A STRAIN OF LEFT TRAPEZIUS MUSCLE, INITIAL ENCOUNTER: ICD-10-CM

## 2024-06-05 DIAGNOSIS — M79.641 CHRONIC HAND PAIN, RIGHT: ICD-10-CM

## 2024-06-05 DIAGNOSIS — I10 PRIMARY HYPERTENSION: Primary | ICD-10-CM

## 2024-06-05 DIAGNOSIS — G89.29 CHRONIC HAND PAIN, RIGHT: ICD-10-CM

## 2024-06-05 DIAGNOSIS — M79.642 CHRONIC HAND PAIN, LEFT: ICD-10-CM

## 2024-06-05 DIAGNOSIS — M54.42 CHRONIC BILATERAL LOW BACK PAIN WITH LEFT-SIDED SCIATICA: ICD-10-CM

## 2024-06-05 PROCEDURE — 3075F SYST BP GE 130 - 139MM HG: CPT | Performed by: FAMILY MEDICINE

## 2024-06-05 PROCEDURE — 3079F DIAST BP 80-89 MM HG: CPT | Performed by: FAMILY MEDICINE

## 2024-06-05 PROCEDURE — 99214 OFFICE O/P EST MOD 30 MIN: CPT | Performed by: FAMILY MEDICINE

## 2024-06-05 PROCEDURE — 90750 HZV VACC RECOMBINANT IM: CPT | Performed by: FAMILY MEDICINE

## 2024-06-05 PROCEDURE — 90471 IMMUNIZATION ADMIN: CPT | Performed by: FAMILY MEDICINE

## 2024-06-05 RX ORDER — GABAPENTIN 800 MG/1
800 TABLET ORAL 3 TIMES DAILY
Qty: 90 TABLET | Refills: 2 | Status: SHIPPED | OUTPATIENT
Start: 2024-06-05

## 2024-06-05 RX ORDER — AMLODIPINE AND BENAZEPRIL HYDROCHLORIDE 5; 10 MG/1; MG/1
1 CAPSULE ORAL DAILY
Qty: 90 CAPSULE | Refills: 0 | Status: SHIPPED | OUTPATIENT
Start: 2024-06-05

## 2024-06-05 RX ORDER — ALBUTEROL SULFATE 90 UG/1
2 AEROSOL, METERED RESPIRATORY (INHALATION) EVERY 4 HOURS PRN
Qty: 18 G | Refills: 0 | Status: SHIPPED | OUTPATIENT
Start: 2024-06-05 | End: 2025-06-05

## 2024-06-05 RX ORDER — ETODOLAC 400 MG/1
400 TABLET, EXTENDED RELEASE ORAL DAILY
Qty: 30 TABLET | Refills: 2 | Status: SHIPPED | OUTPATIENT
Start: 2024-06-05

## 2024-06-05 RX ORDER — CYCLOBENZAPRINE HCL 10 MG
14 TABLET ORAL NIGHTLY PRN
Qty: 14 TABLET | Refills: 0 | Status: SHIPPED | OUTPATIENT
Start: 2024-06-05

## 2024-06-05 RX ORDER — TRAMADOL HYDROCHLORIDE 50 MG/1
100 TABLET ORAL EVERY 12 HOURS PRN
Qty: 120 TABLET | Refills: 0 | Status: SHIPPED | OUTPATIENT
Start: 2024-06-05 | End: 2024-07-05

## 2024-06-05 RX ORDER — NALOXONE HYDROCHLORIDE 4 MG/.1ML
4 SPRAY NASAL AS NEEDED
Qty: 2 EACH | Refills: 0 | Status: CANCELLED | OUTPATIENT
Start: 2024-06-05

## 2024-06-05 ASSESSMENT — LIFESTYLE VARIABLES: TOTAL SCORE: 0

## 2024-06-05 ASSESSMENT — PATIENT HEALTH QUESTIONNAIRE - PHQ9
2. FEELING DOWN, DEPRESSED OR HOPELESS: NOT AT ALL
1. LITTLE INTEREST OR PLEASURE IN DOING THINGS: NOT AT ALL
SUM OF ALL RESPONSES TO PHQ9 QUESTIONS 1 AND 2: 0

## 2024-06-05 NOTE — PROGRESS NOTES
Subjective   Patient ID: Kobe Cisneros is a 53 y.o. male who presents for Follow-up.    HPI     Patient states that he has had a knot on the left side of his neck for the past 2 months.    Patient states he has been having sharp pains in his lower back RT side more frequently. Pt states there was no injury he can recall.       Patient has hypertension.  Patient does not monitor BP at home.    Denies CP, SOB, dizziness, and LE edema.    Patient is compliant with antihypertensive therapy and denies any noted side effects.        He has chronic back pain.   Pt reports back pain is unchanged.  Still adequately controlled with the current medications.  The pain has not been radiating and is more localized to the lower back.  He does have a hx of past lumbar surgery.  Patient denies any lower extremity weakness or incoordination.  He denies any saddle anesthesia.  He denies any bowel or bladder dysfunction.      He has COPD.  No exacerbations.  Uses albuterol as needed.  Continues to smoke.     Has chronic bilat hand pain.  Pain is persistent but stable at this time.      OARRS:  Francisco Ashford DO on 6/5/2024  9:17 AM  I have personally reviewed the OARRS report for Kobe Cisneros. I have considered the risks of abuse, dependence, addiction and diversion and I believe that it is clinically appropriate for Kobe Cisneros to be prescribed this medication    Is the patient prescribed a combination of a benzodiazepine and opioid?  No    Last Urine Drug Screen / ordered today: No  Recent Results (from the past 8760 hour(s))   Confirmation Opiate/Opioid/Benzo Prescription Compliance    Collection Time: 02/22/24  8:49 AM   Result Value Ref Range    Clonazepam <25 <25 ng/mL    7-Aminoclonazepam <25 <25 ng/mL    Alprazolam <25 <25 ng/mL    Alpha-Hydroxyalprazolam <25 <25 ng/mL    Midazolam <25 <25 ng/mL    Alpha-Hydroxymidazolam <25 <25 ng/mL    Chlordiazepoxide <25 <25 ng/mL    Diazepam <25 <25 ng/mL    Nordiazepam <25 <25  ng/mL    Temazepam <25 <25 ng/mL    Oxazepam <25 <25 ng/mL    Lorazepam <25 <25 ng/mL    Methadone <25 <25 ng/mL    EDDP <25 <25 ng/mL    6-Acetylmorphine <25 <25 ng/mL    Codeine <50 <50 ng/mL    Hydrocodone <25 <25 ng/mL    Hydromorphone <25 <25 ng/mL    Morphine  <50 <50 ng/mL    Norhydrocodone <25 <25 ng/mL    Noroxycodone <25 <25 ng/mL    Oxycodone <25 <25 ng/mL    Oxymorphone <25 <25 ng/mL    Fentanyl <2.5 <2.5 ng/mL    Norfentanyl <2.5 <2.5 ng/mL    Tramadol >1,000 (H) <50 ng/mL    O-Desmethyltramadol >1,000 (H) <50 ng/mL    Zolpidem <25 <25 ng/mL    Zolpidem Metabolite (ZCA) <25 <25 ng/mL     Results are as expected.         Controlled Substance Agreement:  Date of the Last Agreement: 2/22/2024  Reviewed Controlled Substance Agreement including but not limited to the benefits, risks, and alternatives to treatment with a Controlled Substance medication(s).    Opioids:  What is the patient's goal of therapy? Reduce pain on as needed basis  Is this being achieved with current treatment? yes    I have calculated the patient's Morphine Dose Equivalent (MED):   I have considered referral to Pain Management and/or a specialist, and do not feel it is necessary at this time.    I feel that it is clinically indicated to continue this current medication regimen after consideration of alternative therapies, and other non-opioid treatment.    Opioid Risk Screening:  Family History of Substance Abuse  Alcohol: 0 (6/5/2024  9:00 AM)  Illegal Drugs: 0 (6/5/2024  9:00 AM)  Prescription Drugs: 0 (6/5/2024  9:00 AM)    Personal History of Substance Abuse  Alcohol: 0 (6/5/2024  9:00 AM)  Illegal Drugs: 0 (6/5/2024  9:00 AM)  Prescription Drugs: 0 (6/5/2024  9:00 AM)    Patient Age (16-45)  Age (16-45): 0 (6/5/2024  9:00 AM)    History of Preadolescent Sexual Abuse  History of Preadolescent Sexual Abuse: .0 (6/5/2024  9:00 AM)    Psychological Disease  Attention Deficit Disorder, Obsessive Compulsive Disorder, Bipolar,  Schizophrenia: 0 (6/5/2024  9:00 AM)  Depression: 0 (6/5/2024  9:00 AM)    Total Score  Total Score: 0 (6/5/2024  9:00 AM)    Total Score Risk Category  TOTAL SCORE CATEGORY: Low Risk (0-3) (6/5/2024  9:00 AM)        Pain Assessment:  Analgesia  What was your pain level on average during the past week?: 4  What was your pain level at its worst during the past week?: 7  What percentage of your pain has been relieved during the past week?: 40 %  Is the amount of pain relief you are now obtaining from your current pain relievers enough to make a real difference in your life?: Y  Query to Clinician: Is the patient's pain relief clinically significant?: Yes    Activities of Daily Living  Physical Functioning: Same  Family Relationships: Same  Social Relationships: Same  Mood: Same  Sleep Patterns: Same  Overall Functioning: Same    Adverse Events  Is patient experiencing any side effects from current pain relievers?: N  Patient's Overall Severity of Side Effects: None      Assessment  Is your overall impression that this patient is benefiting from opioid therapy?: Yes  Specific Analgesic Plan: Continue present regimen            Review of Systems  Constitutional: Patient denies any fever, chills, loss of appetite, or unexplained weight loss.  Cardiovascular: Patient denies any chest pain, shortness of breath with exertion, tachycardia, palpitations, orthopnea, or paroxysmal nocturnal dyspnea.  Respiratory: Patient denies any cough, shortness breath, or wheezing.  Gastrointestinal: Patient denies any nausea, vomiting, diarrhea, constipation, melena, hematochezia, or reflux symptoms  Skin: Denies any rashes or skin lesions  Neurology: Patient denies any new motor or sensory losses. Denies any numbness, tingling, weakness, and incoordination of the extremities. Patient also denies any tremor, seizures, or gait instability.  Endocrinology: Denies any polyuria, polydipsia, polyphagia, or heat/cold intolerance.    MS: Pain on  "left side of neck.  Chronic low back pain and bilateral hand pain.      Objective   /84   Pulse 86   Temp 36.3 °C (97.3 °F)   Ht 1.778 m (5' 10\")   Wt 88.7 kg (195 lb 9.6 oz)   SpO2 96%   BMI 28.07 kg/m²     Physical Exam  General Appearance: Alert and cooperative, in no acute distress, well-developed/well-nourished, overweight male.     Neck: Tenderness on palpation of LT trapezius muscle.     Heart: Regular rate and rhythm without murmur or ectopy.  Lungs: Clear to auscultation bilaterally with good air exchange.  Skin: Good turgor, moist, warm and without rashes or lesions.  Neurological exam: Alert and oriented ×3, no tremor, normal gait.  Extremities: No clubbing, cyanosis, or edema    MS: Palpable tightness of the LT trapezius muscle which is also notably tender to palpation. Spurling's test is negative.      Assessment/Plan   HTN  Blood pressure is stable based on in-office visits.   Continue current medication.     Chronic bilateral low back pain with left-sided sciatica  Back pain remains persistent but stable.  We will continue the current medications.  Pt has seen pain mgmt who recommended physical therapy.    He believes he can proceed with the previously recommended physical therapy.  Referral was done today.     Chronic hand pain, left  Hand pain is stable with the current treatment regimen and we will continue with the same.     Chronic hand pain, right  Hand pain stable with the current treatment regimen and we will continue with the same.     COPD   Stable based on symptoms.  Pt to continue the albuterol MDI as needed.     Increased PSA velocity:  PSA increased to 2.29 from 0.77.  We will be checking the PSA with his next labs as pt preferred to repeat before considering referral to urology.     Strain of left trapezius muscle, initial encounter:  Recommended some stretches he can do to relieve tension.  Will refer him to Physical Therapy.  We will start him on Flexeril for the muscle " tenderness.   - cyclobenzaprine (Flexeril) 10 mg tablet; Take 1.5 tablets (15 mg) by mouth as needed at bedtime for muscle spasms. Refill: 0      Need for shingles vaccine:  Dose #2 of Shingrix given.    Follow up in 3 months.       Scribe Attestation  By signing my name below, I, Dieter Espinoarben , Theodore   attest that this documentation has been prepared under the direction and in the presence of Francisco Ashford DO.      Orders Placed This Encounter   Procedures    Zoster vaccine, recombinant, adult (SHINGRIX)    Comprehensive Metabolic Panel    Lipid Panel    PSA, total and free    Referral to Physical Therapy     Requested Prescriptions     Signed Prescriptions Disp Refills    etodolac XL (Lodine XL) 400 mg 24 hr tablet 30 tablet 2     Sig: Take 1 tablet (400 mg) by mouth once daily. Take with food    gabapentin (Neurontin) 800 mg tablet 90 tablet 2     Sig: Take 1 tablet (800 mg) by mouth 3 times a day.    traMADol (Ultram) 50 mg tablet 120 tablet 0     Sig: Take 2 tablets (100 mg) by mouth every 12 hours if needed for severe pain (7 - 10).    albuterol (ProAir HFA) 90 mcg/actuation inhaler 18 g 0     Sig: Inhale 2 puffs every 4 hours if needed for wheezing or shortness of breath.    amLODIPine-benazepriL (Lotrel) 5-10 mg capsule 90 capsule 0     Sig: Take 1 capsule by mouth once daily.    cyclobenzaprine (Flexeril) 10 mg tablet 14 tablet 0     Sig: Take 1.5 tablets (15 mg) by mouth as needed at bedtime for muscle spasms.

## 2024-06-05 NOTE — PATIENT INSTRUCTIONS
Schedule physical therapy as we discussed.    Follow up in 3 months with labs to be done PRIOR.    It was a pleasure to see you today. Thank you for choosing us for your health care needs.    If you have lab or other testing completed and have not been informed of results within one week, please call the office for your results.    If you haven't done so, consider signing up for [facility], the [facility] personal health record. Ask the staff how you can get started.

## 2024-07-02 DIAGNOSIS — G89.29 CHRONIC BILATERAL LOW BACK PAIN WITH LEFT-SIDED SCIATICA: ICD-10-CM

## 2024-07-02 DIAGNOSIS — S46.812A STRAIN OF LEFT TRAPEZIUS MUSCLE, INITIAL ENCOUNTER: ICD-10-CM

## 2024-07-02 DIAGNOSIS — M54.42 CHRONIC BILATERAL LOW BACK PAIN WITH LEFT-SIDED SCIATICA: ICD-10-CM

## 2024-07-08 RX ORDER — TRAMADOL HYDROCHLORIDE 50 MG/1
100 TABLET ORAL EVERY 12 HOURS PRN
Qty: 120 TABLET | Refills: 0 | Status: SHIPPED | OUTPATIENT
Start: 2024-07-08 | End: 2024-08-07

## 2024-07-08 RX ORDER — CYCLOBENZAPRINE HCL 10 MG
10 TABLET ORAL NIGHTLY PRN
Qty: 14 TABLET | Refills: 0 | Status: SHIPPED | OUTPATIENT
Start: 2024-07-08

## 2024-08-08 DIAGNOSIS — G89.29 CHRONIC BILATERAL LOW BACK PAIN WITH LEFT-SIDED SCIATICA: ICD-10-CM

## 2024-08-08 DIAGNOSIS — M54.42 CHRONIC BILATERAL LOW BACK PAIN WITH LEFT-SIDED SCIATICA: ICD-10-CM

## 2024-08-09 RX ORDER — TRAMADOL HYDROCHLORIDE 50 MG/1
100 TABLET ORAL EVERY 12 HOURS PRN
Qty: 120 TABLET | Refills: 0 | Status: SHIPPED | OUTPATIENT
Start: 2024-08-09 | End: 2024-09-08

## 2024-09-05 ENCOUNTER — APPOINTMENT (OUTPATIENT)
Dept: PRIMARY CARE | Facility: CLINIC | Age: 54
End: 2024-09-05
Payer: COMMERCIAL

## 2024-09-05 VITALS
BODY MASS INDEX: 27.82 KG/M2 | OXYGEN SATURATION: 96 % | SYSTOLIC BLOOD PRESSURE: 132 MMHG | DIASTOLIC BLOOD PRESSURE: 84 MMHG | HEIGHT: 70 IN | TEMPERATURE: 98.4 F | WEIGHT: 194.3 LBS | HEART RATE: 91 BPM

## 2024-09-05 DIAGNOSIS — M79.642 CHRONIC HAND PAIN, LEFT: ICD-10-CM

## 2024-09-05 DIAGNOSIS — Z23 NEED FOR VACCINATION: ICD-10-CM

## 2024-09-05 DIAGNOSIS — G89.29 CHRONIC HAND PAIN, RIGHT: ICD-10-CM

## 2024-09-05 DIAGNOSIS — M54.42 CHRONIC BILATERAL LOW BACK PAIN WITH LEFT-SIDED SCIATICA: ICD-10-CM

## 2024-09-05 DIAGNOSIS — R97.20 INCREASED PROSTATE SPECIFIC ANTIGEN (PSA) VELOCITY: ICD-10-CM

## 2024-09-05 DIAGNOSIS — M79.641 CHRONIC HAND PAIN, RIGHT: ICD-10-CM

## 2024-09-05 DIAGNOSIS — M54.2 NECK PAIN: Primary | ICD-10-CM

## 2024-09-05 DIAGNOSIS — G89.29 CHRONIC BILATERAL LOW BACK PAIN WITH LEFT-SIDED SCIATICA: ICD-10-CM

## 2024-09-05 DIAGNOSIS — J44.9 CHRONIC OBSTRUCTIVE PULMONARY DISEASE, UNSPECIFIED COPD TYPE (MULTI): ICD-10-CM

## 2024-09-05 DIAGNOSIS — I10 PRIMARY HYPERTENSION: ICD-10-CM

## 2024-09-05 DIAGNOSIS — G89.29 CHRONIC HAND PAIN, LEFT: ICD-10-CM

## 2024-09-05 PROCEDURE — 3075F SYST BP GE 130 - 139MM HG: CPT | Performed by: FAMILY MEDICINE

## 2024-09-05 PROCEDURE — 3079F DIAST BP 80-89 MM HG: CPT | Performed by: FAMILY MEDICINE

## 2024-09-05 PROCEDURE — 90471 IMMUNIZATION ADMIN: CPT | Performed by: FAMILY MEDICINE

## 2024-09-05 PROCEDURE — 3008F BODY MASS INDEX DOCD: CPT | Performed by: FAMILY MEDICINE

## 2024-09-05 PROCEDURE — 99214 OFFICE O/P EST MOD 30 MIN: CPT | Performed by: FAMILY MEDICINE

## 2024-09-05 PROCEDURE — 90656 IIV3 VACC NO PRSV 0.5 ML IM: CPT | Performed by: FAMILY MEDICINE

## 2024-09-05 RX ORDER — AMLODIPINE AND BENAZEPRIL HYDROCHLORIDE 5; 10 MG/1; MG/1
1 CAPSULE ORAL DAILY
Qty: 90 CAPSULE | Refills: 0 | Status: SHIPPED | OUTPATIENT
Start: 2024-09-05

## 2024-09-05 RX ORDER — GABAPENTIN 800 MG/1
800 TABLET ORAL 3 TIMES DAILY
Qty: 90 TABLET | Refills: 2 | Status: SHIPPED | OUTPATIENT
Start: 2024-09-05

## 2024-09-05 RX ORDER — TRAMADOL HYDROCHLORIDE 50 MG/1
100 TABLET ORAL EVERY 12 HOURS PRN
Qty: 120 TABLET | Refills: 0 | Status: SHIPPED | OUTPATIENT
Start: 2024-09-05 | End: 2024-10-05

## 2024-09-05 NOTE — PATIENT INSTRUCTIONS
Have labs and the x-ray of the neck soon.  Proceed with the physical therapy.    Follow up in 3 months.    It was a pleasure to see you today. Thank you for choosing us for your health care needs.    If you have lab or other testing completed and have not been informed of results within one week, please call the office for your results.    If you haven't done so, consider signing up for [facility], the [facility] personal health record. Ask the staff how you can get started.

## 2024-09-05 NOTE — PROGRESS NOTES
Subjective   Patient ID: Kobe Cisneros is a 54 y.o. male who presents for Follow-up.    HPI     Patient states he is still experiencing pain on the left side of his neck. He states the pain is worse and is radiating up into the base of the skull. He says he sleeps in a recliner as he cannot sleep in his bed due to the pain. He has not started physical therapy due to being busy with work. He plans to start physical therapy in the near future.     No new concerns today.    Labs: no recent   PSA: 02/2024  ColoRect: none      Patient has hypertension.  Patient does not monitor BP at home.    Denies CP, SOB, dizziness, and LE edema.    Patient is compliant with antihypertensive therapy and denies any noted side effects.        He has chronic back pain.   Back pain is unchanged in severity.  Still adequately controlled with the current medications.  The pain has not been radiating and is more localized to the lower back.  He does have a hx of past lumbar surgery.  Patient denies any lower extremity weakness or incoordination.  He denies any saddle anesthesia.  He denies any bowel or bladder dysfunction.      He has COPD.  No exacerbations.  Uses albuterol as needed.  Continues to smoke.     Has chronic bilat hand pain.  Pain is persistent but stable on current medications.      OARRS:  Francisco Ashford DO on 9/5/2024  8:38 AM  I have personally reviewed the OARRS report for Kobe Cisneros. I have considered the risks of abuse, dependence, addiction and diversion and I believe that it is clinically appropriate for Kobe Cisneros to be prescribed this medication    Is the patient prescribed a combination of a benzodiazepine and opioid?  No    Last Urine Drug Screen / ordered today: No  Recent Results (from the past 8760 hour(s))   Confirmation Opiate/Opioid/Benzo Prescription Compliance    Collection Time: 02/22/24  8:49 AM   Result Value Ref Range    Clonazepam <25 <25 ng/mL    7-Aminoclonazepam <25 <25 ng/mL     Alprazolam <25 <25 ng/mL    Alpha-Hydroxyalprazolam <25 <25 ng/mL    Midazolam <25 <25 ng/mL    Alpha-Hydroxymidazolam <25 <25 ng/mL    Chlordiazepoxide <25 <25 ng/mL    Diazepam <25 <25 ng/mL    Nordiazepam <25 <25 ng/mL    Temazepam <25 <25 ng/mL    Oxazepam <25 <25 ng/mL    Lorazepam <25 <25 ng/mL    Methadone <25 <25 ng/mL    EDDP <25 <25 ng/mL    6-Acetylmorphine <25 <25 ng/mL    Codeine <50 <50 ng/mL    Hydrocodone <25 <25 ng/mL    Hydromorphone <25 <25 ng/mL    Morphine  <50 <50 ng/mL    Norhydrocodone <25 <25 ng/mL    Noroxycodone <25 <25 ng/mL    Oxycodone <25 <25 ng/mL    Oxymorphone <25 <25 ng/mL    Fentanyl <2.5 <2.5 ng/mL    Norfentanyl <2.5 <2.5 ng/mL    Tramadol >1,000 (H) <50 ng/mL    O-Desmethyltramadol >1,000 (H) <50 ng/mL    Zolpidem <25 <25 ng/mL    Zolpidem Metabolite (ZCA) <25 <25 ng/mL     Results are as expected.         Controlled Substance Agreement:  Date of the Last Agreement: 2/22/2024  Reviewed Controlled Substance Agreement including but not limited to the benefits, risks, and alternatives to treatment with a Controlled Substance medication(s).    Opioids:  What is the patient's goal of therapy? Reduce pain on as needed basis  Is this being achieved with current treatment? yes    I have calculated the patient's Morphine Dose Equivalent (MED):   I have considered referral to Pain Management and/or a specialist, and do not feel it is necessary at this time.    I feel that it is clinically indicated to continue this current medication regimen after consideration of alternative therapies, and other non-opioid treatment.    Opioid Risk Screening:  Family History of Substance Abuse  Alcohol: 0 (6/5/2024  9:00 AM)  Illegal Drugs: 0 (6/5/2024  9:00 AM)  Prescription Drugs: 0 (6/5/2024  9:00 AM)    Personal History of Substance Abuse  Alcohol: 0 (6/5/2024  9:00 AM)  Illegal Drugs: 0 (6/5/2024  9:00 AM)  Prescription Drugs: 0 (6/5/2024  9:00 AM)    Patient Age (16-45)  Age (16-45): 0 (6/5/2024   9:00 AM)    History of Preadolescent Sexual Abuse  History of Preadolescent Sexual Abuse: .0 (6/5/2024  9:00 AM)    Psychological Disease  Attention Deficit Disorder, Obsessive Compulsive Disorder, Bipolar, Schizophrenia: 0 (6/5/2024  9:00 AM)  Depression: 0 (6/5/2024  9:00 AM)    Total Score  Total Score: 0 (6/5/2024  9:00 AM)    Total Score Risk Category  TOTAL SCORE CATEGORY: Low Risk (0-3) (6/5/2024  9:00 AM)        Pain Assessment:  Analgesia  What was your pain level on average during the past week?: 3  What was your pain level at its worst during the past week?: 7  What percentage of your pain has been relieved during the past week?: 40 %  Is the amount of pain relief you are now obtaining from your current pain relievers enough to make a real difference in your life?: Y  Query to Clinician: Is the patient's pain relief clinically significant?: Yes    Activities of Daily Living  Physical Functioning: Same  Family Relationships: Same  Social Relationships: Same  Mood: Same  Sleep Patterns: Same  Overall Functioning: Same    Adverse Events  Is patient experiencing any side effects from current pain relievers?: N  Patient's Overall Severity of Side Effects: None      Assessment  Is your overall impression that this patient is benefiting from opioid therapy?: Yes  Specific Analgesic Plan: Continue present regimen          Review of Systems  Constitutional: Patient denies any fever, chills, loss of appetite, or unexplained weight loss.  Cardiovascular: Patient denies any chest pain, shortness of breath with exertion, tachycardia, palpitations, orthopnea, or paroxysmal nocturnal dyspnea.  Respiratory: Patient denies any cough, shortness breath, or wheezing.  Gastrointestinal: Patient denies any nausea, vomiting, diarrhea, constipation, melena, hematochezia, or reflux symptoms  Skin: Denies any rashes or skin lesions  Neurology: Patient denies any new motor or sensory losses. Denies any numbness, tingling, weakness,  "and incoordination of the extremities. Patient also denies any tremor, seizures, or gait instability.  Endocrinology: Denies any polyuria, polydipsia, polyphagia, or heat/cold intolerance.      Objective   /84 Comment: has not taken bp  Pulse 91   Temp 36.9 °C (98.4 °F) (Temporal)   Ht 1.778 m (5' 10\")   Wt 88.1 kg (194 lb 4.8 oz)   SpO2 96%   BMI 27.88 kg/m²   He has not taken his BP medication this morning.     Physical Exam  General Appearance: Alert and cooperative, in no acute distress, well-developed/well-nourished, overweight male.     Neck: Supple and without adenopathy or rigidity. There is no JVD at 90° and no carotid bruits are noted. There is no thyromegaly, thyroid tenderness, or palpable thyroid nodules.  Heart: Regular rate and rhythm without murmur or ectopy.  Lungs: Clear to auscultation bilaterally with good air exchange.  Skin: Good turgor, moist, warm and without rashes or lesions.  Neurological exam: Alert and oriented ×3, no tremor, normal gait.  Extremities: No clubbing, cyanosis, or edema      Assessment/Plan    HTN  Blood pressure is stable based on in-office visits.   Continue current medication.     Chronic bilateral low back pain with left-sided sciatica  Back pain remains persistent but stable.  We will continue the current medications.  Pt has seen pain mgmt who recommended physical therapy.    He believes he can proceed with the previously recommended physical therapy.  Referral was done today.     Chronic hand pain, left  Hand pain is stable with the current treatment regimen and we will continue with the same.     Chronic hand pain, right  Hand pain stable with the current treatment regimen and we will continue with the same.     COPD   Stable based on symptoms.  Pt to continue the albuterol MDI as needed.     Increased PSA velocity:  PSA increased to 2.29 from 0.77.  We will be checking the PSA with his next labs as pt preferred to repeat before considering referral to " urology.  9/5/2024:  PSA level to be done SOON    Need for vaccination:  Flu vaccine administered today.    Neck pain:  Will order an XR of his C-spine as the pain has gotten worse.   He will continue the Flexeril as needed at bedtime.   He plans to start Physical Therapy in the near future.    Follow up in 3 months.       Scribe Attestation  By signing my name below, I, Theodore Lewis   attest that this documentation has been prepared under the direction and in the presence of Francisco Ashford DO.    Orders Placed This Encounter   Procedures    XR cervical spine complete 4-5 views    Flu vaccine, trivalent, preservative free, age 6 months and greater (Fluraix/Fluzone/Flulaval)     Requested Prescriptions     Signed Prescriptions Disp Refills    gabapentin (Neurontin) 800 mg tablet 90 tablet 2     Sig: Take 1 tablet (800 mg) by mouth 3 times a day.    traMADol (Ultram) 50 mg tablet 120 tablet 0     Sig: Take 2 tablets (100 mg) by mouth every 12 hours if needed for severe pain (7 - 10).    amLODIPine-benazepriL (Lotrel) 5-10 mg capsule 90 capsule 0     Sig: Take 1 capsule by mouth once daily.

## 2024-09-06 ENCOUNTER — HOSPITAL ENCOUNTER (OUTPATIENT)
Dept: RADIOLOGY | Facility: HOSPITAL | Age: 54
Discharge: HOME | End: 2024-09-06
Payer: COMMERCIAL

## 2024-09-06 DIAGNOSIS — M54.2 NECK PAIN: ICD-10-CM

## 2024-09-06 PROCEDURE — 72050 X-RAY EXAM NECK SPINE 4/5VWS: CPT

## 2024-09-07 ENCOUNTER — LAB (OUTPATIENT)
Dept: LAB | Facility: LAB | Age: 54
End: 2024-09-07
Payer: COMMERCIAL

## 2024-09-07 DIAGNOSIS — R97.20 INCREASED PROSTATE SPECIFIC ANTIGEN (PSA) VELOCITY: ICD-10-CM

## 2024-09-07 DIAGNOSIS — I10 PRIMARY HYPERTENSION: ICD-10-CM

## 2024-09-07 LAB
ALBUMIN SERPL BCP-MCNC: 4.3 G/DL (ref 3.4–5)
ALP SERPL-CCNC: 73 U/L (ref 33–120)
ALT SERPL W P-5'-P-CCNC: 19 U/L (ref 10–52)
ANION GAP SERPL CALC-SCNC: 11 MMOL/L (ref 10–20)
AST SERPL W P-5'-P-CCNC: 16 U/L (ref 9–39)
BILIRUB SERPL-MCNC: 0.6 MG/DL (ref 0–1.2)
BUN SERPL-MCNC: 12 MG/DL (ref 6–23)
CALCIUM SERPL-MCNC: 9.4 MG/DL (ref 8.6–10.3)
CHLORIDE SERPL-SCNC: 105 MMOL/L (ref 98–107)
CHOLEST SERPL-MCNC: 236 MG/DL (ref 0–199)
CHOLESTEROL/HDL RATIO: 2.6
CO2 SERPL-SCNC: 28 MMOL/L (ref 21–32)
CREAT SERPL-MCNC: 1.02 MG/DL (ref 0.5–1.3)
EGFRCR SERPLBLD CKD-EPI 2021: 88 ML/MIN/1.73M*2
GLUCOSE SERPL-MCNC: 106 MG/DL (ref 74–99)
HDLC SERPL-MCNC: 91.5 MG/DL
LDLC SERPL CALC-MCNC: 131 MG/DL
NON HDL CHOLESTEROL: 145 MG/DL (ref 0–149)
POTASSIUM SERPL-SCNC: 4.8 MMOL/L (ref 3.5–5.3)
PROT SERPL-MCNC: 6.6 G/DL (ref 6.4–8.2)
SODIUM SERPL-SCNC: 139 MMOL/L (ref 136–145)
TRIGL SERPL-MCNC: 68 MG/DL (ref 0–149)
VLDL: 14 MG/DL (ref 0–40)

## 2024-09-07 PROCEDURE — 84154 ASSAY OF PSA FREE: CPT

## 2024-09-07 PROCEDURE — 36415 COLL VENOUS BLD VENIPUNCTURE: CPT

## 2024-09-07 PROCEDURE — 80053 COMPREHEN METABOLIC PANEL: CPT

## 2024-09-07 PROCEDURE — 84153 ASSAY OF PSA TOTAL: CPT

## 2024-09-07 PROCEDURE — 80061 LIPID PANEL: CPT

## 2024-09-09 LAB
PSA FREE MFR SERPL: 29 %
PSA FREE SERPL-MCNC: 0.2 NG/ML
PSA SERPL IA-MCNC: 0.7 NG/ML (ref 0–4)

## 2024-09-23 ENCOUNTER — PATIENT MESSAGE (OUTPATIENT)
Dept: PRIMARY CARE | Facility: CLINIC | Age: 54
End: 2024-09-23
Payer: COMMERCIAL

## 2024-09-23 DIAGNOSIS — M54.2 NECK PAIN: Primary | ICD-10-CM

## 2024-09-23 DIAGNOSIS — M54.42 CHRONIC BILATERAL LOW BACK PAIN WITH LEFT-SIDED SCIATICA: ICD-10-CM

## 2024-09-23 DIAGNOSIS — G89.29 CHRONIC BILATERAL LOW BACK PAIN WITH LEFT-SIDED SCIATICA: ICD-10-CM

## 2024-10-02 ENCOUNTER — OFFICE VISIT (OUTPATIENT)
Dept: PAIN MEDICINE | Facility: CLINIC | Age: 54
End: 2024-10-02
Payer: COMMERCIAL

## 2024-10-02 DIAGNOSIS — G89.29 CHRONIC BILATERAL LOW BACK PAIN WITH LEFT-SIDED SCIATICA: ICD-10-CM

## 2024-10-02 DIAGNOSIS — M54.2 NECK PAIN: ICD-10-CM

## 2024-10-02 DIAGNOSIS — M54.42 CHRONIC BILATERAL LOW BACK PAIN WITH LEFT-SIDED SCIATICA: ICD-10-CM

## 2024-10-02 PROCEDURE — 99214 OFFICE O/P EST MOD 30 MIN: CPT | Performed by: PAIN MEDICINE

## 2024-10-02 PROCEDURE — 99204 OFFICE O/P NEW MOD 45 MIN: CPT | Performed by: PAIN MEDICINE

## 2024-10-02 PROCEDURE — G2211 COMPLEX E/M VISIT ADD ON: HCPCS | Performed by: PAIN MEDICINE

## 2024-10-02 ASSESSMENT — COLUMBIA-SUICIDE SEVERITY RATING SCALE - C-SSRS
6. HAVE YOU EVER DONE ANYTHING, STARTED TO DO ANYTHING, OR PREPARED TO DO ANYTHING TO END YOUR LIFE?: NO
2. HAVE YOU ACTUALLY HAD ANY THOUGHTS OF KILLING YOURSELF?: NO
1. IN THE PAST MONTH, HAVE YOU WISHED YOU WERE DEAD OR WISHED YOU COULD GO TO SLEEP AND NOT WAKE UP?: NO

## 2024-10-02 ASSESSMENT — PAIN SCALES - GENERAL
PAINLEVEL_OUTOF10: 9
PAINLEVEL: 9

## 2024-10-02 ASSESSMENT — PAIN - FUNCTIONAL ASSESSMENT: PAIN_FUNCTIONAL_ASSESSMENT: 0-10

## 2024-10-02 NOTE — H&P
History Of Present Illness  Kobe Cisneros is a 54 y.o. male presenting with   neck pain for past  2 months. Has had  imaging and  is taking  tramadol which was on already for back pain from  primary   Currently describing the pain radiating from the base of the neck towards the head rating  of the pain at a level of 9 out of 10 pain is aggravated during sleeping.  It seems that with activity the pain eases off some.  Describing the feeling as a tight muscle that is constant  Currently on a combination of etodolac 400 mg cyclobenzaprine 10 mg and gabapentin 800 mg 3 times per day and taking tramadol 100 mg twice per day.  Has been on this combination of medication for at least a year.  With minimal improvement.  Past Medical History  HTN.  Surgical History  Past Surgical History:   Procedure Laterality Date    OTHER SURGICAL HISTORY  11/14/2022    Back surgery    OTHER SURGICAL HISTORY  11/14/2022    Knee surgery    OTHER SURGICAL HISTORY  11/14/2022    Hernia repair    OTHER SURGICAL HISTORY  11/14/2022    Surgery     Social History  He reports that he has been smoking cigarettes. He has a 60 pack-year smoking history. He has never used smokeless tobacco. He reports current alcohol use of about 36.0 standard drinks of alcohol per week. He reports that he does not use drugs.    Family History  Family History   Problem Relation Name Age of Onset    Diabetes Mother          Allergies  No Known Allergies  Review of Systems   12 Systems have been reviewed as follows.   Constitutional: Fever, weight gain, weight loss, appetite change, night sweats, fatigue, chills.  Eyes : blurry, double vision, vision, loss, tearing, redness, pain, sensitivity to light, glaucoma.  Ears, nose, mouth, and throat: Hearing loss, ringing in the ears, ear pain, nasal congestion, nasal drainage, nosebleeds, mouth, throat, irritation tooth problem.  Cardiovascular :chest pain, pressure, heart tracing,palpaitations , sweating, leg swelling,  high or low blood pressure  Pulmonary: Cough, yellow or green sputum, blood and sputum, shortness of breath, wheezing  Gastrointestinal: Nause, vomiting, diarrhea, constipation, pain, blood in stool, or vomitus, heartburn, difficulty swallowing  Genitourinary: incontinence, abnormal bleeding, abnormal discharge, urinary frequency, urinary hesitancy, pain, impotence sexual problem, infection, urinary retention  Musculoskeletal: Pain, stiffness, joint, redness or warmth, arthritis, back pain, weakness, muscle wasting, sprain or fracture  Neuro: Weight weakness, dizziness, change in voice, change in taste change in vision, change in hearing, loss, or change of sensation, trouble walking, balance problems coordination problems, shaking, speech problem  Endocrine , cold or heat intolerance, blood sugar problem, weight gain or loss missed periods hot flashes, sweats, change in body hair, change in libido, increased thirst, increased urination  Heme/lymph: Swelling, bleeding, problem anemia, bruising, enlarged lymph nodes  Allergic/immunologic: H. plus nasal drip, watery itchy eyes, nasal drainage, immunosuppressed  The above, were reviewed and noted negative except as noted.     Physical Exam   Vital signs reviewed, documented in chart     General:  Appears well, does not look in any major distress  Alert    HEENT:  Head atraumatic  Eyes normal inspection  PERRL  Normal ENT inspection  No signs of dehydration    NECK:  Normal inspection  Range of motion within normal     RESPIRATORY:  No respiratory distress    CVS:  Heart rate and rhythm regular    ABDOMEN/GI  Soft  Non-tender  No distention  No organomegaly      BACK:  Normal inspection, flexion and extension within normal limit   no tenderness upon the palpation of the facet joint  Si joints none tender to palpations     EXTREMITIES:  Non-Tender  Full ROM  Normal appearance  No Pedal edema  Power symmetrical , sensory examination preserved.    NEURO:  Alert and  oriented X 3  CNS normal as tested without focal neurological deficit   Sensation normal  Motor normal  reflexes normal    PSYCH:  Mood normal  Affect normal    SKIN:  Color normal  No rash  Warm  Dry  no sign of skin marking supportive of IV drug usage /abuse.     Last Recorded Vitals  There were no vitals taken for this visit.  XR cervical spine complete 4-5 views    Result Date: 9/7/2024  Interpreted By:  Kyar Verdugo, STUDY: Cervical spine, five views.   INDICATION: Signs/Symptoms:pain left neck.   COMPARISON: None.   ACCESSION NUMBER(S): [id number]   ORDERING CLINICIAN: ALLY MOLINA   FINDINGS: Grade 1 C3-4 retrolisthesis. Mild C5-6 spondylosis with mild disc height loss and small osteophytes. Mild bilateral mid cervical osseous foraminal stenosis involving C4-5 and C5-6 levels on the left and C5-6 level on the right likely secondary to uncovertebral and facet degenerative changes. Vertebral body heights are preserved. Posterior elements are intact. Prevertebral soft tissues are unremarkable.       1. Multilevel cervical spine degenerative changes as above.   MACRO: None.   Signed by: Kyra Verdugo 9/7/2024 12:40 PM Dictation workstation:   [id number]     Assessment/Plan   54 years old with history and physical examination supportive of cervical spondylosis cervicalgia    Plan  Advised the patient about the different modalities available for the treatment of his condition I would recommend for the patient to initiate physical therapy targeting the cervical spine area if the pain persist despite the therapy then we could consider the patient for a cervical epidural steroid injection to be performed under fluoroscopic guidance targeting the C7-T1 level with injection of contrast material to confirm needle placement benefits and risk were discussed with the patient and he would like to proceed      The above clinical summary has been dictated with voice recognition software. It has not been proofread for  grammatical errors, typographical mistakes, or other semantic inconsistencies.    Thank you for visiting our office today. It was our pleasure to take part in your healthcare.     Please do not hesitate to contact the pain clinic after your visit with any questions or concerns at  M-F 8-4 pm       Ronni Arauz M.D.  Medical Director , Division of Pain Medicine [facility]   of Anesthesiology and Pain Medicine  [facility] [place] [address]     Office: [phone]  Fax: [phone]      Ronni Arauz MD

## 2024-10-02 NOTE — PROGRESS NOTES
Here  for neck pain for past  2 months. Has had  imaging and  is taking  tramadol which was on already for back pain from  primary.

## 2024-10-07 DIAGNOSIS — M54.42 CHRONIC BILATERAL LOW BACK PAIN WITH LEFT-SIDED SCIATICA: ICD-10-CM

## 2024-10-07 DIAGNOSIS — G89.29 CHRONIC BILATERAL LOW BACK PAIN WITH LEFT-SIDED SCIATICA: ICD-10-CM

## 2024-10-07 RX ORDER — TRAMADOL HYDROCHLORIDE 50 MG/1
100 TABLET ORAL EVERY 12 HOURS PRN
Qty: 120 TABLET | Refills: 0 | Status: SHIPPED | OUTPATIENT
Start: 2024-10-07 | End: 2024-11-06

## 2024-10-31 ENCOUNTER — EVALUATION (OUTPATIENT)
Dept: PHYSICAL THERAPY | Facility: CLINIC | Age: 54
End: 2024-10-31
Payer: COMMERCIAL

## 2024-10-31 DIAGNOSIS — M54.2 NECK PAIN: ICD-10-CM

## 2024-10-31 PROCEDURE — 97161 PT EVAL LOW COMPLEX 20 MIN: CPT | Mod: GP

## 2024-10-31 PROCEDURE — 97140 MANUAL THERAPY 1/> REGIONS: CPT | Mod: GP

## 2024-10-31 PROCEDURE — 97110 THERAPEUTIC EXERCISES: CPT | Mod: GP

## 2024-11-01 ASSESSMENT — ENCOUNTER SYMPTOMS
OCCASIONAL FEELINGS OF UNSTEADINESS: 0
DEPRESSION: 0
LOSS OF SENSATION IN FEET: 0

## 2024-11-05 DIAGNOSIS — M54.42 CHRONIC BILATERAL LOW BACK PAIN WITH LEFT-SIDED SCIATICA: ICD-10-CM

## 2024-11-05 DIAGNOSIS — G89.29 CHRONIC BILATERAL LOW BACK PAIN WITH LEFT-SIDED SCIATICA: ICD-10-CM

## 2024-11-05 RX ORDER — TRAMADOL HYDROCHLORIDE 50 MG/1
100 TABLET ORAL EVERY 12 HOURS PRN
Qty: 52 TABLET | Refills: 0 | Status: SHIPPED | OUTPATIENT
Start: 2024-11-05 | End: 2024-11-18

## 2024-11-05 NOTE — TELEPHONE ENCOUNTER
I tried to reach pt to see if he is finished with the script that was sent on 10/07/2024  No answer.      Will you send short script until Dejon comes back he's all out?

## 2024-11-07 ENCOUNTER — APPOINTMENT (OUTPATIENT)
Dept: PHYSICAL THERAPY | Facility: CLINIC | Age: 54
End: 2024-11-07
Payer: COMMERCIAL

## 2024-11-14 ENCOUNTER — TREATMENT (OUTPATIENT)
Dept: PHYSICAL THERAPY | Facility: CLINIC | Age: 54
End: 2024-11-14
Payer: COMMERCIAL

## 2024-11-14 DIAGNOSIS — M54.2 NECK PAIN: Primary | ICD-10-CM

## 2024-11-14 PROCEDURE — 97140 MANUAL THERAPY 1/> REGIONS: CPT | Mod: GP

## 2024-11-14 PROCEDURE — 97110 THERAPEUTIC EXERCISES: CPT | Mod: GP

## 2024-11-19 DIAGNOSIS — M54.42 CHRONIC BILATERAL LOW BACK PAIN WITH LEFT-SIDED SCIATICA: ICD-10-CM

## 2024-11-19 DIAGNOSIS — G89.29 CHRONIC BILATERAL LOW BACK PAIN WITH LEFT-SIDED SCIATICA: ICD-10-CM

## 2024-11-21 ENCOUNTER — TREATMENT (OUTPATIENT)
Dept: PHYSICAL THERAPY | Facility: CLINIC | Age: 54
End: 2024-11-21
Payer: COMMERCIAL

## 2024-11-21 DIAGNOSIS — M54.2 NECK PAIN: Primary | ICD-10-CM

## 2024-11-21 PROCEDURE — 97140 MANUAL THERAPY 1/> REGIONS: CPT | Mod: GP

## 2024-11-21 PROCEDURE — 97110 THERAPEUTIC EXERCISES: CPT | Mod: GP

## 2024-11-21 RX ORDER — TRAMADOL HYDROCHLORIDE 50 MG/1
100 TABLET ORAL EVERY 12 HOURS PRN
Qty: 120 TABLET | Refills: 0 | Status: SHIPPED | OUTPATIENT
Start: 2024-11-21

## 2024-11-21 NOTE — PROGRESS NOTES
Physical Therapy    Physical Therapy Treatment    Patient Name: Kobe Cisneros  MRN: [id number]  Today's Date: 11/21/2024    Time Entry:   Time Calculation  Start Time: 1701  Stop Time: 1739  Time Calculation (min): 38 min     PT Therapeutic Procedures Time Entry  Manual Therapy Time Entry: 25  Therapeutic Exercise Time Entry: 13                 Visit #3  20% COINS, 400 DED, 2500 OOP MAX, 25 VS PLAN YR,   NO AUTH *PLAN YR JULY 1-JUNE 30     Assessment:  Patient continues to benefit from dry needling and manual therapy treatment. Improvement in pre-session symptoms noted at end of session. Issued TB progressions for HEP and encouraged performance.     Plan:   OP PT Plan  Treatment/Interventions: Dry needling, Hot pack, Electrical stimulation, Education/ Instruction, Manual therapy, Neuromuscular re-education, Self care/ home management, Therapeutic activities, Therapeutic exercises, Vasopneumatic device  PT Plan: Skilled PT  PT Frequency: 1 time per week  Certification Period Start Date: 10/31/24  Certification Period End Date: 01/29/25  Number of Treatments Authorized: 25  Rehab Potential: Good  Plan of Care Agreement: Patient    Current Problem  No diagnosis found.    Subjective    Patient reports pain symptoms were improved for several days after last visit.      Pain   2/10 L posterior cervical spine    Objective     Palpation    Tenderness: present      Paraspinous: right      Trapezius: right      Periscapular: right      Spinous process: upper cervical and mid cervical    Right      Muscle tone: increased  Range of Motion      Cervical flexion: 1-2 finger breadths. Cervical flexion detail: pain and guarding.     Cervical extension: reduced extension (26-50%). Cervical extension detail: guarding.     Right      Lateral bending: reduced bend (51-75%). Lateral bending detail: pain.       Lateral rotation: reduced rotation (51-75%). Lateral rotation detail: pain and guarding.     Left      Lateral bending: reduced  bend (51-75%). Lateral bending detail: pain.       Lateral rotation: reduced rotation (51-75%). Lateral rotation detail: pain and guarding.    Treatments:  Therapeutic Exercise (01633):   UBE 3'/3'  Uni Row GTB  Uni Ext GTB  ER GTB  Horiz abd BTB    Skilled intervention utilized in the appropriate selection & application of above exercises. Verbal and tactile cues provided for proper form and technique. Pt. demonstrated appropriate form & verbalized understanding of optimal technique for above exercises.        Manual Therapy (59364):   Patient educated in dry needling precautions, indications, and contraindications. Patient is aware of the risks and benefits of procedure and wishes to have it performed. No adverse reaction to the dry needling noted.    Needles used: 30 mm x2  Location needled: L Upper trapezius, L levator scap  PT performed winding needle manipulation  Needles left in situ for 5 min  NMES applied for local twitch response enhancement of needling effect on local tissue    Soft tissue mobilization to: L upper trapezius, L levator scap    Appropriate force, direction, amplitude, & velocity for selected techniques to improve joint & soft tissue mobility & enhance ADL performance. Rationale & procedure given for above treatment techniques, & verbal consent obtained prior to initiating treatment.           OP EDUCATION:   Outpatient Education  Individual(s) Educated: Patient  Education Provided: Anatomy, Home Exercise Program, Home Safety, Physiology, POC, Posture, Body Mechanics    Goals:  Patient will improve NDI score to </=5     Patient will improve cervical rotation AROM to >/=  for improved cervical mobility.     Patient will improve cervical lateral flexion AROM to >/=  for improved cervical mobility.     Patient will improve middle trapezius strength to >/=4+/5 for improved periscapular and postural stability.     Patient will improve lower trapezius strength to >/=4+/5 for improved periscapular  and postural stability.     Patient will be independent with HEP.

## 2024-11-26 ENCOUNTER — TREATMENT (OUTPATIENT)
Dept: PHYSICAL THERAPY | Facility: CLINIC | Age: 54
End: 2024-11-26
Payer: COMMERCIAL

## 2024-11-26 DIAGNOSIS — M54.2 NECK PAIN: Primary | ICD-10-CM

## 2024-11-26 PROCEDURE — 97110 THERAPEUTIC EXERCISES: CPT | Mod: GP

## 2024-11-26 PROCEDURE — 97140 MANUAL THERAPY 1/> REGIONS: CPT | Mod: GP

## 2024-11-26 NOTE — PROGRESS NOTES
Physical Therapy    Physical Therapy Treatment    Patient Name: Kobe Cisneros  MRN: [id number]  Today's Date: 11/27/2024    Time Entry:   Time Calculation  Start Time: 1647  Stop Time: 1725  Time Calculation (min): 38 min     PT Therapeutic Procedures Time Entry  Manual Therapy Time Entry: 23  Therapeutic Exercise Time Entry: 15                 Visit #4  20% COINS, 400 DED, 2500 OOP MAX, 25 VS PLAN YR,   NO AUTH *PLAN YR JULY 1-JUNE 30     Assessment:  Patient responded well to dry needling of paracervical musculature bilaterally and L supraspinatus this session. Reported improvement in pre-session neck pain and tightness. Added isometric ER with overhead lifts to HEP with GTB loop.     Plan:   OP PT Plan  Treatment/Interventions: Dry needling, Hot pack, Electrical stimulation, Education/ Instruction, Manual therapy, Neuromuscular re-education, Self care/ home management, Therapeutic activities, Therapeutic exercises, Vasopneumatic device  PT Plan: Skilled PT  PT Frequency: 1 time per week  Certification Period Start Date: 10/31/24  Certification Period End Date: 01/29/25  Number of Treatments Authorized: 25  Rehab Potential: Good  Plan of Care Agreement: Patient    Current Problem  1. Neck pain            Subjective    Patient reports onset of bilateral cervical spine tightness and L shoulder soreness.  Pain   5/10 L posterior cervical spine    Objective     Palpation    Tenderness: present      Paraspinous: right      Trapezius: right      Periscapular: right      Spinous process: upper cervical and mid cervical    Right      Muscle tone: increased  Range of Motion      Cervical flexion: 1-2 finger breadths. Cervical flexion detail: pain and guarding.     Cervical extension: reduced extension (26-50%). Cervical extension detail: guarding.     Right      Lateral bending: reduced bend (51-75%). Lateral bending detail: pain.       Lateral rotation: reduced rotation (51-75%). Lateral rotation detail: pain and  guarding.     Left      Lateral bending: reduced bend (51-75%). Lateral bending detail: pain.       Lateral rotation: reduced rotation (51-75%). Lateral rotation detail: pain and guarding.    Treatments:  Therapeutic Exercise (09060):   UBE 3'/3'  Uni Row GTB  Uni Ext GTB  ER GTB  Horiz abd BTB  Iso ER+flexion with GTB loop  Dynamic UT/Levator stretch shrug with 10#    Skilled intervention utilized in the appropriate selection & application of above exercises. Verbal and tactile cues provided for proper form and technique. Pt. demonstrated appropriate form & verbalized understanding of optimal technique for above exercises.        Manual Therapy (67947):   Patient educated in dry needling precautions, indications, and contraindications. Patient is aware of the risks and benefits of procedure and wishes to have it performed. No adverse reaction to the dry needling noted.    Needles used: 30 mm x4  Location needled: R and L upper trap x1 needle each. X2 to L supraspinatus with NMES  PT performed winding needle manipulation  Needles left in situ for 5 min  NMES applied for local twitch response enhancement of needling effect on local tissue    Soft tissue mobilization to: L upper trapezius, L levator scap    Appropriate force, direction, amplitude, & velocity for selected techniques to improve joint & soft tissue mobility & enhance ADL performance. Rationale & procedure given for above treatment techniques, & verbal consent obtained prior to initiating treatment.           OP EDUCATION:   Outpatient Education  Individual(s) Educated: Patient  Education Provided: Anatomy, Home Exercise Program, Home Safety, Physiology, POC, Posture, Body Mechanics    Goals:  Patient will improve NDI score to </=5     Patient will improve cervical rotation AROM to >/=  for improved cervical mobility.     Patient will improve cervical lateral flexion AROM to >/=  for improved cervical mobility.     Patient will improve middle trapezius  strength to >/=4+/5 for improved periscapular and postural stability.     Patient will improve lower trapezius strength to >/=4+/5 for improved periscapular and postural stability.     Patient will be independent with HEP.

## 2024-12-11 ENCOUNTER — APPOINTMENT (OUTPATIENT)
Dept: PRIMARY CARE | Facility: CLINIC | Age: 54
End: 2024-12-11
Payer: COMMERCIAL

## 2024-12-11 VITALS
BODY MASS INDEX: 27.72 KG/M2 | TEMPERATURE: 98.4 F | HEART RATE: 87 BPM | DIASTOLIC BLOOD PRESSURE: 84 MMHG | OXYGEN SATURATION: 94 % | SYSTOLIC BLOOD PRESSURE: 134 MMHG | HEIGHT: 70 IN | WEIGHT: 193.6 LBS

## 2024-12-11 DIAGNOSIS — G89.29 CHRONIC HAND PAIN, RIGHT: ICD-10-CM

## 2024-12-11 DIAGNOSIS — M79.642 CHRONIC HAND PAIN, LEFT: ICD-10-CM

## 2024-12-11 DIAGNOSIS — G89.29 CHRONIC BILATERAL LOW BACK PAIN WITH LEFT-SIDED SCIATICA: ICD-10-CM

## 2024-12-11 DIAGNOSIS — G89.29 CHRONIC HAND PAIN, LEFT: ICD-10-CM

## 2024-12-11 DIAGNOSIS — M54.42 CHRONIC BILATERAL LOW BACK PAIN WITH LEFT-SIDED SCIATICA: ICD-10-CM

## 2024-12-11 DIAGNOSIS — J44.9 CHRONIC OBSTRUCTIVE PULMONARY DISEASE, UNSPECIFIED COPD TYPE (MULTI): ICD-10-CM

## 2024-12-11 DIAGNOSIS — M79.641 CHRONIC HAND PAIN, RIGHT: ICD-10-CM

## 2024-12-11 DIAGNOSIS — I10 PRIMARY HYPERTENSION: Primary | ICD-10-CM

## 2024-12-11 DIAGNOSIS — M54.2 NECK PAIN: ICD-10-CM

## 2024-12-11 DIAGNOSIS — S46.812A STRAIN OF LEFT TRAPEZIUS MUSCLE, INITIAL ENCOUNTER: ICD-10-CM

## 2024-12-11 DIAGNOSIS — R97.20 INCREASED PROSTATE SPECIFIC ANTIGEN (PSA) VELOCITY: ICD-10-CM

## 2024-12-11 DIAGNOSIS — Z12.5 PROSTATE CANCER SCREENING: ICD-10-CM

## 2024-12-11 PROCEDURE — 3075F SYST BP GE 130 - 139MM HG: CPT | Performed by: FAMILY MEDICINE

## 2024-12-11 PROCEDURE — 99214 OFFICE O/P EST MOD 30 MIN: CPT | Performed by: FAMILY MEDICINE

## 2024-12-11 PROCEDURE — 3079F DIAST BP 80-89 MM HG: CPT | Performed by: FAMILY MEDICINE

## 2024-12-11 PROCEDURE — 3008F BODY MASS INDEX DOCD: CPT | Performed by: FAMILY MEDICINE

## 2024-12-11 RX ORDER — CYCLOBENZAPRINE HCL 10 MG
10 TABLET ORAL NIGHTLY PRN
Qty: 14 TABLET | Refills: 0 | Status: CANCELLED | OUTPATIENT
Start: 2024-12-11

## 2024-12-11 RX ORDER — ALBUTEROL SULFATE 90 UG/1
2 INHALANT RESPIRATORY (INHALATION) EVERY 4 HOURS PRN
Qty: 18 G | Refills: 0 | Status: SHIPPED | OUTPATIENT
Start: 2024-12-11 | End: 2025-12-11

## 2024-12-11 RX ORDER — AMLODIPINE AND BENAZEPRIL HYDROCHLORIDE 5; 10 MG/1; MG/1
1 CAPSULE ORAL DAILY
Qty: 90 CAPSULE | Refills: 0 | Status: SHIPPED | OUTPATIENT
Start: 2024-12-11

## 2024-12-11 RX ORDER — ETODOLAC 400 MG/1
400 TABLET, EXTENDED RELEASE ORAL DAILY
Qty: 30 TABLET | Refills: 2 | Status: SHIPPED | OUTPATIENT
Start: 2024-12-11

## 2024-12-11 RX ORDER — GABAPENTIN 300 MG/1
900 CAPSULE ORAL 3 TIMES DAILY
Qty: 810 TABLET | Refills: 0 | Status: SHIPPED | OUTPATIENT
Start: 2024-12-11

## 2024-12-11 NOTE — PATIENT INSTRUCTIONS
Increased the gabapentin to 900 mg 3 times per day.    Follow up in 3 months with labs to be done PRIOR.    It was a pleasure to see you today. Thank you for choosing us for your health care needs.    If you have lab or other testing completed and have not been informed of results within one week, please call the office for your results.    If you haven't done so, consider signing up for [facility], the [facility] personal health record. Ask the staff how you can get started.

## 2024-12-11 NOTE — PROGRESS NOTES
Subjective   Patient ID: Kobe Cisneros is a 54 y.o. male who presents for Follow-up.    HPI     Pt states he is still experiencing some neck pain. He states physical therapy has not been helping.     Pt says he has been unable to sleep in his bed for 6 months now due to the pain.  No radiation to the UE.  No UE weakness or incoordination.    Pt had an appointment with Dr. Arauz (pain management) on 10/2/2024 and the pt was advised to complete 6 to 8 weeks of physical therapy before considering steroid injections.     No recent labs  PSA: 09/2024  Colonoscopy: none (due)     Patient has hypertension.  Patient does not monitor BP at home.    Denies CP, SOB, dizziness, and LE edema.    Patient is compliant with antihypertensive therapy and denies any noted side effects.     He has chronic back pain.   Back pain is unchanged in severity.  Still adequately controlled with the current medications.  The pain has not been radiating and is more localized to the lower back.  He does have a hx of past lumbar surgery.  Patient denies any lower extremity weakness or incoordination.  He denies any saddle anesthesia.  He denies any bowel or bladder dysfunction.      He has COPD.  No exacerbations.  Uses albuterol as needed.  Continues to smoke.     Has chronic bilat hand pain.  Pain is persistent but stable on current medications.    OARRS:  Francisco Ashford DO on 1/21/2025  8:57 AM  I have personally reviewed the OARRS report for Kobe Cisneros. I have considered the risks of abuse, dependence, addiction and diversion and I believe that it is clinically appropriate for Kobe Cisneros to be prescribed this medication    Is the patient prescribed a combination of a benzodiazepine and opioid?  No    Last Urine Drug Screen / ordered today: No  Recent Results (from the past 8760 hours)   Confirmation Opiate/Opioid/Benzo Prescription Compliance    Collection Time: 02/22/24  8:49 AM   Result Value Ref Range    Clonazepam <25 <25  ng/mL    7-Aminoclonazepam <25 <25 ng/mL    Alprazolam <25 <25 ng/mL    Alpha-Hydroxyalprazolam <25 <25 ng/mL    Midazolam <25 <25 ng/mL    Alpha-Hydroxymidazolam <25 <25 ng/mL    Chlordiazepoxide <25 <25 ng/mL    Diazepam <25 <25 ng/mL    Nordiazepam <25 <25 ng/mL    Temazepam <25 <25 ng/mL    Oxazepam <25 <25 ng/mL    Lorazepam <25 <25 ng/mL    Methadone <25 <25 ng/mL    EDDP <25 <25 ng/mL    6-Acetylmorphine <25 <25 ng/mL    Codeine <50 <50 ng/mL    Hydrocodone <25 <25 ng/mL    Hydromorphone <25 <25 ng/mL    Morphine  <50 <50 ng/mL    Norhydrocodone <25 <25 ng/mL    Noroxycodone <25 <25 ng/mL    Oxycodone <25 <25 ng/mL    Oxymorphone <25 <25 ng/mL    Fentanyl <2.5 <2.5 ng/mL    Norfentanyl <2.5 <2.5 ng/mL    Tramadol >1,000 (H) <50 ng/mL    O-Desmethyltramadol >1,000 (H) <50 ng/mL    Zolpidem <25 <25 ng/mL    Zolpidem Metabolite (ZCA) <25 <25 ng/mL     Results are as expected.         Controlled Substance Agreement:  Date of the Last Agreement: 2/22/2024  Reviewed Controlled Substance Agreement including but not limited to the benefits, risks, and alternatives to treatment with a Controlled Substance medication(s).    Opioids:  What is the patient's goal of therapy? Reduce pain as needed  Is this being achieved with current treatment? yes    I have calculated the patient's Morphine Dose Equivalent (MED):   I have considered referral to Pain Management and/or a specialist, and do not feel it is necessary at this time.    I feel that it is clinically indicated to continue this current medication regimen after consideration of alternative therapies, and other non-opioid treatment.    Opioid Risk Screening:  Family History of Substance Abuse  Alcohol: 0 (6/5/2024  9:00 AM)  Illegal Drugs: 0 (6/5/2024  9:00 AM)  Prescription Drugs: 0 (6/5/2024  9:00 AM)    Personal History of Substance Abuse  Alcohol: 0 (6/5/2024  9:00 AM)  Illegal Drugs: 0 (6/5/2024  9:00 AM)  Prescription Drugs: 0 (6/5/2024  9:00 AM)    Patient Age  "(16-45)  Age (16-45): 0 (6/5/2024  9:00 AM)    History of Preadolescent Sexual Abuse  History of Preadolescent Sexual Abuse: .0 (6/5/2024  9:00 AM)    Psychological Disease  Attention Deficit Disorder, Obsessive Compulsive Disorder, Bipolar, Schizophrenia: 0 (6/5/2024  9:00 AM)  Depression: 0 (6/5/2024  9:00 AM)    Total Score  Total Score: 0 (6/5/2024  9:00 AM)    Total Score Risk Category  TOTAL SCORE CATEGORY: Low Risk (0-3) (6/5/2024  9:00 AM)        Pain Assessment:  No data recorded      Review of Systems  Constitutional: Patient denies any fever, chills, loss of appetite, or unexplained weight loss.  Cardiovascular: Patient denies any chest pain, shortness of breath with exertion, tachycardia, palpitations, orthopnea, or paroxysmal nocturnal dyspnea.  Respiratory: Patient denies any cough, shortness of breath, or wheezing.  Gastrointestinal: Patient denies any nausea, vomiting, diarrhea, constipation, melena, hematochezia, or reflux symptoms  Skin: Denies any rashes or skin lesions  Neurology: Patient denies any new motor or sensory losses. Denies any numbness, tingling, weakness, and incoordination of the extremities. Patient also denies any tremor, seizures, or gait instability.  Endocrinology: Denies any polyuria, polydipsia, polyphagia, or heat/cold intolerance.    MS: Chronic bilateral hand pain and neck pain.    Objective   /84   Pulse 87   Temp 36.9 °C (98.4 °F)   Ht 1.778 m (5' 10\")   Wt 87.8 kg (193 lb 9.6 oz)   SpO2 94%   BMI 27.78 kg/m²     Physical Exam  General Appearance: Alert and cooperative, in no acute distress, well-developed/well-nourished, overweight male.     Neck: Supple and without adenopathy or rigidity. There is no JVD at 90° and no carotid bruits are noted. There is no thyromegaly, thyroid tenderness, or palpable thyroid nodules.  Heart: Regular rate and rhythm without murmur or ectopy.  Lungs: Clear to auscultation bilaterally with good air exchange.  Skin: Good turgor, " moist, warm and without rashes or lesions.  Neurological exam: Alert and oriented ×3, no tremor, normal gait.  Extremities: No clubbing, cyanosis, or edema      Assessment/Plan     HTN:  Stable on in office readings.  Blood pressure is stable based on in-office visits.   Continue current medication.     Chronic bilateral low back pain with left-sided sciatica:  Back pain remains persistent but stable.  We will continue the current medications.  Pt has seen pain mgmt who recommended physical therapy.  12/11/2024: Pt will discontinue gabapentin 800 mg dose. Will change gabapentin to the 300 mg dose. He will take 3 capsules three times per day (900 mg per dose).   Recommended he follow up with pain management after the completion of physical therapy.   - gabapentin (Neurontin) 300 mg tablet; Take 3 capsules (300 mg) by mouth 3 times a day. Dispense: 810 tablet; Refill: 0    Chronic hand pain, left:  Hand pain is stable with the current treatment regimen and we will continue with the same.     Chronic hand pain, right:  Hand pain stable with the current treatment regimen and we will continue with the same.     COPD:   Stable based on symptoms.  Pt to continue the albuterol MDI as needed.  Smoking cessation was encouraged.  He declines any assistance with smoking cessation.     Increased PSA velocity:  PSA increased to 2.29 from 0.77.  9/7/2024 PSA return to 0.7  We will continue to monitor.    Neck pain:  9/6/2024 x-ray reveals degenerative changes.  Symptoms have continued.  Physical therapy was not helpful.  Pt had an appointment with Dr. Arauz (pain management) on 10/2/2024. Pt was advised to complete 6 to 8 weeks of physical therapy before considering steroid injections.     Strain of left trapezius muscle:  Pain is stable but persistent.   Pt is currently going to Physical Therapy that does not seem to be helping.   Pt will follow up with pain management after the completion of Physical Therapy.   Pt will  continue the Flexeril as needed at bedtime.     Prostate cancer screening:  Ordered annual PSA screening lab.       PSA DUE 2/22/2025       Follow up in 3 months.       Scribe Attestation  By signing my name below, I, Theodore Lewis   attest that this documentation has been prepared under the direction and in the presence of Francisco Ashford DO.    Orders Placed This Encounter   Procedures    Basic Metabolic Panel    Lipid Panel    Prostate Specific Antigen, Screen     Requested Prescriptions     Signed Prescriptions Disp Refills    etodolac XL (Lodine XL) 400 mg 24 hr tablet 30 tablet 2     Sig: Take 1 tablet (400 mg) by mouth once daily. Take with food    gabapentin (Neurontin) 300 mg capsule 810 tablet 0     Sig: Take 3 capsules (900 mg) by mouth 3 times a day.    amLODIPine-benazepriL (Lotrel) 5-10 mg capsule 90 capsule 0     Sig: Take 1 capsule by mouth once daily.    albuterol (ProAir HFA) 90 mcg/actuation inhaler 18 g 0     Sig: Inhale 2 puffs every 4 hours if needed for wheezing or shortness of breath.

## 2024-12-17 DIAGNOSIS — G89.29 CHRONIC BILATERAL LOW BACK PAIN WITH LEFT-SIDED SCIATICA: ICD-10-CM

## 2024-12-17 DIAGNOSIS — M54.42 CHRONIC BILATERAL LOW BACK PAIN WITH LEFT-SIDED SCIATICA: ICD-10-CM

## 2024-12-17 RX ORDER — TRAMADOL HYDROCHLORIDE 50 MG/1
100 TABLET ORAL EVERY 12 HOURS PRN
Qty: 120 TABLET | Refills: 0 | Status: SHIPPED | OUTPATIENT
Start: 2024-12-19

## 2024-12-26 ENCOUNTER — APPOINTMENT (OUTPATIENT)
Dept: PHYSICAL THERAPY | Facility: CLINIC | Age: 54
End: 2024-12-26
Payer: COMMERCIAL

## 2024-12-30 ENCOUNTER — TREATMENT (OUTPATIENT)
Dept: PHYSICAL THERAPY | Facility: CLINIC | Age: 54
End: 2024-12-30
Payer: COMMERCIAL

## 2024-12-30 DIAGNOSIS — M54.2 NECK PAIN: Primary | ICD-10-CM

## 2024-12-30 PROCEDURE — 97110 THERAPEUTIC EXERCISES: CPT | Mod: GP

## 2024-12-30 PROCEDURE — 97140 MANUAL THERAPY 1/> REGIONS: CPT | Mod: GP

## 2024-12-30 NOTE — PROGRESS NOTES
Physical Therapy    Physical Therapy Treatment    Patient Name: Kobe Cisneros  MRN: [id number]  Today's Date: 12/30/2024    Time Entry:   Time Calculation  Start Time: 1640  Stop Time: 1718  Time Calculation (min): 38 min     PT Therapeutic Procedures Time Entry  Manual Therapy Time Entry: 25  Therapeutic Exercise Time Entry: 13                 Visit #5  20% COINS, 400 DED, 2500 OOP MAX, 25 VS PLAN YR,   NO AUTH *PLAN YR JULY 1-JUNE 30     Assessment:  Patient continues to have temporary relief and improvement of symptoms with dry needling but reports regression to baseline in 1-2 days and worsening of symptoms with lifting activities. At this time patient will be placed on hold while he follows up with referring MD as further imaging and medical assessment is necessary.    Plan:  30 day hold    Current Problem  1. Neck pain              Subjective    Patient reports pain is returned to baseline level of intensity and worsened with lifting.    Pain   7/10 L posterior cervical spine-pre-session  3/10 post session    Objective     Palpation    Tenderness: present      Paraspinous: Left      Trapezius: Left      Periscapular: Left      Spinous process: upper cervical and mid cervical    Right      Muscle tone: increased  Range of Motion      Cervical flexion: 1-2 finger breadths. Cervical flexion detail: pain and guarding.     Cervical extension: reduced extension (26-50%). Cervical extension detail: guarding.     Right      Lateral bending: reduced bend (51-75%). Lateral bending detail: pain.       Lateral rotation: reduced rotation (51-75%). Lateral rotation detail: pain and guarding.     Left      Lateral bending: reduced bend (51-75%). Lateral bending detail: pain.       Lateral rotation: reduced rotation (51-75%). Lateral rotation detail: pain and guarding.    Treatments:  Therapeutic Exercise (42275):   UBE 3'/3'  Uni Row GTB  Uni Ext GTB  ER GTB  Horiz abd BTB  Iso ER+flexion with GTB loop  Dynamic UT/Levator  stretch shrug with 10#  Lateral wall walks RTB loop  Standing abd 5#  Standing scaption 5#    Skilled intervention utilized in the appropriate selection & application of above exercises. Verbal and tactile cues provided for proper form and technique. Pt. demonstrated appropriate form & verbalized understanding of optimal technique for above exercises.        Manual Therapy (57301):   Patient educated in dry needling precautions, indications, and contraindications. Patient is aware of the risks and benefits of procedure and wishes to have it performed. No adverse reaction to the dry needling noted.    Needles used: 30 mm x3  Location needled:  L upper trap x2 needle with NMES, L levator sca x1 needle   PT performed winding needle manipulation  Needles left in situ for 5 min  NMES applied for local twitch response enhancement of needling effect on local tissue    Soft tissue mobilization to: L upper trapezius, L levator scap    Appropriate force, direction, amplitude, & velocity for selected techniques to improve joint & soft tissue mobility & enhance ADL performance. Rationale & procedure given for above treatment techniques, & verbal consent obtained prior to initiating treatment.           OP EDUCATION:   Outpatient Education  Individual(s) Educated: Patient  Education Provided: Anatomy, Home Exercise Program, Home Safety, Physiology, POC, Posture, Body Mechanics    Goals:  Patient will improve NDI score to </=5     Patient will improve cervical rotation AROM to >/=  for improved cervical mobility.     Patient will improve cervical lateral flexion AROM to >/=  for improved cervical mobility.     Patient will improve middle trapezius strength to >/=4+/5 for improved periscapular and postural stability.     Patient will improve lower trapezius strength to >/=4+/5 for improved periscapular and postural stability.     Patient will be independent with HEP.

## 2025-01-20 DIAGNOSIS — M54.42 CHRONIC BILATERAL LOW BACK PAIN WITH LEFT-SIDED SCIATICA: ICD-10-CM

## 2025-01-20 DIAGNOSIS — G89.29 CHRONIC BILATERAL LOW BACK PAIN WITH LEFT-SIDED SCIATICA: ICD-10-CM

## 2025-01-21 RX ORDER — TRAMADOL HYDROCHLORIDE 50 MG/1
100 TABLET ORAL EVERY 12 HOURS PRN
Qty: 120 TABLET | Refills: 0 | Status: SHIPPED | OUTPATIENT
Start: 2025-01-21

## 2025-02-21 DIAGNOSIS — M54.42 CHRONIC BILATERAL LOW BACK PAIN WITH LEFT-SIDED SCIATICA: ICD-10-CM

## 2025-02-21 DIAGNOSIS — G89.29 CHRONIC BILATERAL LOW BACK PAIN WITH LEFT-SIDED SCIATICA: ICD-10-CM

## 2025-02-25 DIAGNOSIS — G89.29 CHRONIC BILATERAL LOW BACK PAIN WITH LEFT-SIDED SCIATICA: ICD-10-CM

## 2025-02-25 DIAGNOSIS — M54.42 CHRONIC BILATERAL LOW BACK PAIN WITH LEFT-SIDED SCIATICA: ICD-10-CM

## 2025-02-25 RX ORDER — TRAMADOL HYDROCHLORIDE 50 MG/1
100 TABLET ORAL EVERY 12 HOURS PRN
Qty: 120 TABLET | Refills: 0 | Status: CANCELLED | OUTPATIENT
Start: 2025-02-25

## 2025-02-26 RX ORDER — TRAMADOL HYDROCHLORIDE 50 MG/1
TABLET ORAL
Qty: 120 TABLET | Refills: 0 | OUTPATIENT
Start: 2025-02-26

## 2025-02-26 NOTE — TELEPHONE ENCOUNTER
Medication refused due to failing protocol.    Requested Prescriptions   Pending Prescriptions Disp Refills    traMADol (Ultram) 50 mg tablet [Pharmacy Med Name: traMADol HCl Oral Tablet 50 MG] 120 tablet 0     Sig: TAKE TWO TABLETS BY MOUTH EVERY 12 HOURS IF NEEDED FOR SEVERE PAIN (7-10)        Opioid Analgesics Protocol Failed - 2/26/2025 11:46 AM        Failed - Opioid pain agreement on file in past year        Failed - Toxicology result on file in past 12 months        Passed - Visit with relevant provider in past 3 months     Recent Visits  Date Type Provider Dept   12/11/24 [facility] Visit [facility] DO [id number]   Showing recent visits within past 90 days and meeting all other requirements  Future Appointments  No visits were found meeting these conditions.  Showing future appointments within next 0 days and meeting all other requirements            Passed - No matching opioid in the past 29 days        Passed - No Benzodiazepines on active med list       Controlled Substance Protocol Failed - 2/26/2025 11:46 AM        Failed - Controlled Substance Agreement on file in past year        Failed - Toxicology result on file in past 12 months        Passed - Medication not refilled in past 29 days        Passed - Visit with relevant provider in past 12 months.

## 2025-03-11 ENCOUNTER — APPOINTMENT (OUTPATIENT)
Dept: PRIMARY CARE | Facility: CLINIC | Age: 55
End: 2025-03-11
Payer: COMMERCIAL

## 2025-03-11 VITALS
HEIGHT: 70 IN | HEART RATE: 97 BPM | OXYGEN SATURATION: 95 % | TEMPERATURE: 98.2 F | BODY MASS INDEX: 28.2 KG/M2 | WEIGHT: 197 LBS | DIASTOLIC BLOOD PRESSURE: 107 MMHG | SYSTOLIC BLOOD PRESSURE: 163 MMHG

## 2025-03-11 DIAGNOSIS — M54.2 NECK PAIN: ICD-10-CM

## 2025-03-11 DIAGNOSIS — S46.819D STRAIN OF TRAPEZIUS MUSCLE, UNSPECIFIED LATERALITY, SUBSEQUENT ENCOUNTER: ICD-10-CM

## 2025-03-11 DIAGNOSIS — G89.29 CHRONIC BILATERAL LOW BACK PAIN WITH LEFT-SIDED SCIATICA: ICD-10-CM

## 2025-03-11 DIAGNOSIS — J44.9 CHRONIC OBSTRUCTIVE PULMONARY DISEASE, UNSPECIFIED COPD TYPE (MULTI): ICD-10-CM

## 2025-03-11 DIAGNOSIS — F17.210 CIGARETTE SMOKER: ICD-10-CM

## 2025-03-11 DIAGNOSIS — M79.641 CHRONIC HAND PAIN, RIGHT: ICD-10-CM

## 2025-03-11 DIAGNOSIS — I10 PRIMARY HYPERTENSION: Primary | ICD-10-CM

## 2025-03-11 DIAGNOSIS — G89.29 CHRONIC HAND PAIN, RIGHT: ICD-10-CM

## 2025-03-11 DIAGNOSIS — M54.42 CHRONIC BILATERAL LOW BACK PAIN WITH LEFT-SIDED SCIATICA: ICD-10-CM

## 2025-03-11 DIAGNOSIS — M79.642 CHRONIC HAND PAIN, LEFT: ICD-10-CM

## 2025-03-11 DIAGNOSIS — G89.29 CHRONIC HAND PAIN, LEFT: ICD-10-CM

## 2025-03-11 DIAGNOSIS — Z79.899 HIGH RISK MEDICATION USE: ICD-10-CM

## 2025-03-11 PROCEDURE — 99214 OFFICE O/P EST MOD 30 MIN: CPT | Performed by: FAMILY MEDICINE

## 2025-03-11 PROCEDURE — 3008F BODY MASS INDEX DOCD: CPT | Performed by: FAMILY MEDICINE

## 2025-03-11 PROCEDURE — 3080F DIAST BP >= 90 MM HG: CPT | Performed by: FAMILY MEDICINE

## 2025-03-11 PROCEDURE — 3077F SYST BP >= 140 MM HG: CPT | Performed by: FAMILY MEDICINE

## 2025-03-11 RX ORDER — ETODOLAC 400 MG/1
400 TABLET, EXTENDED RELEASE ORAL DAILY
Qty: 90 TABLET | Refills: 0 | Status: SHIPPED | OUTPATIENT
Start: 2025-03-11

## 2025-03-11 RX ORDER — NALOXONE HYDROCHLORIDE 4 MG/.1ML
4 SPRAY NASAL AS NEEDED
Qty: 2 EACH | Refills: 0 | Status: SHIPPED | OUTPATIENT
Start: 2025-03-11

## 2025-03-11 RX ORDER — AMLODIPINE AND BENAZEPRIL HYDROCHLORIDE 5; 10 MG/1; MG/1
1 CAPSULE ORAL DAILY
Qty: 90 CAPSULE | Refills: 0 | Status: SHIPPED | OUTPATIENT
Start: 2025-03-11

## 2025-03-11 RX ORDER — GABAPENTIN 300 MG/1
900 CAPSULE ORAL 3 TIMES DAILY
Qty: 90 CAPSULE | Refills: 0 | Status: SHIPPED | OUTPATIENT
Start: 2025-03-11 | End: 2025-03-11 | Stop reason: SDUPTHER

## 2025-03-11 RX ORDER — GABAPENTIN 300 MG/1
900 CAPSULE ORAL 3 TIMES DAILY
Qty: 810 CAPSULE | Refills: 0 | Status: SHIPPED | OUTPATIENT
Start: 2025-03-11

## 2025-03-11 RX ORDER — TRAMADOL HYDROCHLORIDE 50 MG/1
100 TABLET ORAL EVERY 12 HOURS PRN
Qty: 120 TABLET | Refills: 0 | Status: SHIPPED | OUTPATIENT
Start: 2025-03-11 | End: 2025-04-10 | Stop reason: SDUPTHER

## 2025-03-11 NOTE — PROGRESS NOTES
Subjective   Patient ID: Kobe Cisneros is a 54 y.o. male who presents for Follow-up.    HPI     No new concerns     Patient had five sessions of physical therapy for his neck and he states it did not help him.   He still has tenderness in his neck.   Patient states he was told to make a follow up appointment with Dr. Crespo (pain management) but has not scheduled an appointment yet.    No recent BW  PSA: 09/07/2024  Cologuard- Due     LABS ORDERED TO BE DONE PRIOR TO TODAY'S APPOINTMENT WERE NOT COMPLETED.      Patient has hypertension.  Patient does not monitor BP at home.    Denies CP, SOB, dizziness, and LE edema.    Patient is compliant with antihypertensive therapy and denies any noted side effects.     He has chronic back pain.   Back pain is unchanged in severity.  Symptoms have been controlled with the current medications.  The pain has not been radiating and is localized to the lower back.  He does have a hx of past lumbar surgery.  Patient denies any lower extremity weakness or incoordination.  He denies any saddle anesthesia.  He denies any bowel or bladder dysfunction.       Has also has chronic bilat hand pain.  Pain is persistent but stable on current medications.    He has COPD.  No exacerbations.  Uses albuterol as needed.  Continues to smoke.    OARRS:  Francisco Ashford,  on 3/11/2025  5:18 PM  I have personally reviewed the OARRS report for Kobe Cisneros. I have considered the risks of abuse, dependence, addiction and diversion and I believe that it is clinically appropriate for Kobe Cisneros to be prescribed this medication    Is the patient prescribed a combination of a benzodiazepine and opioid?  No    Last Urine Drug Screen / ordered today: Yes  No results found for this or any previous visit (from the past 8760 hours).  N/A    Clinical rationale for not completing a Urine Drug Screen: Testing done at his next scheduled appt which was 3/11/2025.      Controlled Substance  Agreement:  Date of the Last Agreement: 3/11/2025  Reviewed Controlled Substance Agreement including but not limited to the benefits, risks, and alternatives to treatment with a Controlled Substance medication(s).    Opioids:  What is the patient's goal of therapy? Reduce chronic pain on as needed basis  Is this being achieved with current treatment? yes    I have calculated the patient's Morphine Dose Equivalent (MED):   I have considered referral to Pain Management and/or a specialist, and do not feel it is necessary at this time.    I feel that it is clinically indicated to continue this current medication regimen after consideration of alternative therapies, and other non-opioid treatment.    Opioid Risk Screening:  Family History of Substance Abuse  Alcohol: 0 (6/5/2024  9:00 AM)  Illegal Drugs: 0 (6/5/2024  9:00 AM)  Prescription Drugs: 0 (6/5/2024  9:00 AM)    Personal History of Substance Abuse  Alcohol: 0 (6/5/2024  9:00 AM)  Illegal Drugs: 0 (6/5/2024  9:00 AM)  Prescription Drugs: 0 (6/5/2024  9:00 AM)    Patient Age (16-45)  Age (16-45): 0 (6/5/2024  9:00 AM)    History of Preadolescent Sexual Abuse  History of Preadolescent Sexual Abuse: .0 (6/5/2024  9:00 AM)    Psychological Disease  Attention Deficit Disorder, Obsessive Compulsive Disorder, Bipolar, Schizophrenia: 0 (6/5/2024  9:00 AM)  Depression: 0 (6/5/2024  9:00 AM)    Total Score  Total Score: 0 (6/5/2024  9:00 AM)    Total Score Risk Category  TOTAL SCORE CATEGORY: Low Risk (0-3) (6/5/2024  9:00 AM)        Pain Assessment:  No data recorded    Review of Systems  Constitutional: Patient denies any fever, chills, loss of appetite, or unexplained weight loss.  Cardiovascular: Patient denies any chest pain, shortness of breath with exertion, tachycardia, palpitations, orthopnea, or paroxysmal nocturnal dyspnea.  Respiratory: Patient denies any cough, shortness of breath, or wheezing.  Gastrointestinal: Patient denies any nausea, vomiting, diarrhea,  "constipation, melena, hematochezia, or reflux symptoms  Skin: Denies any rashes or skin lesions  Neurology: Patient denies any new motor or sensory losses. Denies any numbness, tingling, weakness, and incoordination of the extremities. Patient also denies any tremor, seizures, or gait instability.  Endocrinology: Denies any polyuria, polydipsia, polyphagia, or heat/cold intolerance.      Objective   BP (!) 163/107 Comment: no bp meds taken. just got off work.  Pulse 97   Temp 36.8 °C (98.2 °F) (Temporal)   Ht 1.778 m (5' 10\")   Wt 89.4 kg (197 lb)   SpO2 95%   BMI 28.27 kg/m²     Physical Exam  General Appearance: Alert and cooperative, in no acute distress, well-developed/well-nourished overweight male.  Neck: Supple and without adenopathy or rigidity. There is no JVD at 90° and no carotid bruits are noted. There is no thyromegaly, thyroid tenderness, or palpable thyroid nodules.  Heart: Regular rate and rhythm without murmur or ectopy.  Lungs: Clear to auscultation bilaterally with good air exchange.  Skin: Good turgor, moist, warm and without rashes or lesions.  Neurological exam: Alert and oriented ×3, no tremor, normal gait.  Extremities: No clubbing, cyanosis, or edema    MS:  Bilateral lower lumbar paraspinal muscle tenderness to palpation.  No obvious spasm.  There is also tenderness over the cervical paraspinals which extends into the upper trapezius muscles bilaterally.    Assessment/Plan       LABS ORDERED TO BE DONE PRIOR TO TODAY'S APPOINTMENT WERE NOT COMPLETED.        HTN:  Stable on in office readings.  Blood pressure is stable based on in-office visits.   Continue current medication.     Chronic bilateral low back pain with left-sided sciatica:  Back pain remains persistent but stable.  We will continue the current medications.  Pt has seen pain mgmt who recommended physical therapy.  12/11/2024: We changed gabapentin to the 300 mg capsules. He will take 3 capsules three times per day (900 mg " per dose).   Recommended he follow up with pain management after the completion of physical therapy.   3/11/2025: Will follow up with pain management in the near future.     Chronic hand pain, left:  Hand pain is stable with the current treatment regimen and we will continue with the same.     Chronic hand pain, right:  Hand pain stable with the current treatment regimen and we will continue with the same.     COPD:   Stable based on symptoms.  Pt to continue the albuterol MDI as needed.  Smoking cessation was encouraged again.    He declines any assistance with smoking cessation.    Neck pain:  9/6/2024 x-ray reveals degenerative changes.  Symptoms have continued.  Physical therapy was not helpful.  Pt had an appointment with Dr. Arauz (pain management) on 10/2/2024. Pt was advised to complete 6 to 8 weeks of physical therapy before considering steroid injections.   3/11/2025: Patient had five total sessions of physical therapy that did not help his neck pain.   Patient will be making a follow up appointment with Dr. Crespo in the near future.    Strain of left trapezius muscle:  Pain is stable but persistent.   Pt is currently going to Physical Therapy that does not seem to be helping.   Pt will follow up with pain management.    High risk medication use:  Urine drug screen and controlled substance agreement completed.    Cigarette smoker:  Patient understands that continued smoking will increase the risks of lung disease, vascular disease, and multiple malignancies.  Pt. has been encouraged to work on smoking cessation and available smoking cessation aids were discussed.  Declines any assistance at this time.      PSA DUE 2/22/2025      Scribe Attestation  By signing my name below, IDieter Scribe   attest that this documentation has been prepared under the direction and in the presence of Francisco Ashford DO.    Orders Placed This Encounter   Procedures    Drug Screen, Urine With Reflex to  Confirmation     Requested Prescriptions     Pending Prescriptions Disp Refills    traMADol (Ultram) 50 mg tablet 120 tablet 0     Sig: Take 2 tablets (100 mg) by mouth every 12 hours if needed for severe pain (7 - 10).    gabapentin (Neurontin) 300 mg capsule 90 capsule 0     Sig: Take 3 capsules (900 mg) by mouth 3 times a day.    naloxone (Narcan) 4 mg/0.1 mL nasal spray 2 each 0     Sig: Administer 1 spray (4 mg) into affected nostril(s) if needed for opioid reversal. May repeat every 2-3 minutes if needed, alternating nostrils, until medical assistance becomes available.    amLODIPine-benazepriL (Lotrel) 5-10 mg capsule 90 capsule 0     Sig: Take 1 capsule by mouth once daily.     Signed Prescriptions Disp Refills    etodolac XL (Lodine XL) 400 mg 24 hr tablet 90 tablet 0     Sig: Take 1 tablet (400 mg) by mouth once daily. Take with food    gabapentin (Neurontin) 300 mg capsule 810 capsule 0     Sig: Take 3 capsules (900 mg) by mouth 3 times a day.

## 2025-03-11 NOTE — PATIENT INSTRUCTIONS
Dr. Arauz  [phone]    Have fasting labs done soon.      Follow up in 3 months.    It was a pleasure to see you today. Thank you for choosing us for your health care needs.    If you have lab or other testing completed and have not been informed of results within one week, please call the office for your results.    If you haven't done so, consider signing up for [facility] [employer], the [facility] personal health record. Ask the staff how you can get started.

## 2025-03-12 LAB
AMPHETAMINES UR QL: NEGATIVE NG/ML
BARBITURATES UR QL: NEGATIVE NG/ML
BENZODIAZ UR QL: NEGATIVE NG/ML
BZE UR QL: NEGATIVE NG/ML
CREAT UR-MCNC: 46.9 MG/DL
METHADONE UR QL: NEGATIVE NG/ML
OPIATES UR QL: NEGATIVE NG/ML
OXIDANTS UR QL: NEGATIVE MCG/ML
OXYCODONE UR QL: NEGATIVE NG/ML
PCP UR QL: NEGATIVE NG/ML
PH UR: 7.5 [PH] (ref 4.5–9)
QUEST NOTES AND COMMENTS: NORMAL
THC UR QL: NEGATIVE NG/ML

## 2025-03-31 ENCOUNTER — TELEPHONE (OUTPATIENT)
Dept: PAIN MEDICINE | Facility: CLINIC | Age: 55
End: 2025-03-31
Payer: COMMERCIAL

## 2025-04-10 DIAGNOSIS — M54.42 CHRONIC BILATERAL LOW BACK PAIN WITH LEFT-SIDED SCIATICA: ICD-10-CM

## 2025-04-10 DIAGNOSIS — G89.29 CHRONIC BILATERAL LOW BACK PAIN WITH LEFT-SIDED SCIATICA: ICD-10-CM

## 2025-04-10 RX ORDER — TRAMADOL HYDROCHLORIDE 50 MG/1
100 TABLET ORAL EVERY 12 HOURS PRN
Qty: 120 TABLET | Refills: 0 | Status: SHIPPED | OUTPATIENT
Start: 2025-04-10

## 2025-04-13 LAB
ANION GAP SERPL CALCULATED.4IONS-SCNC: 9 MMOL/L (CALC) (ref 7–17)
BUN SERPL-MCNC: 11 MG/DL (ref 7–25)
BUN/CREAT SERPL: NORMAL (CALC) (ref 6–22)
CALCIUM SERPL-MCNC: 9.3 MG/DL (ref 8.6–10.3)
CHLORIDE SERPL-SCNC: 103 MMOL/L (ref 98–110)
CHOLEST SERPL-MCNC: 222 MG/DL
CHOLEST/HDLC SERPL: 2.3 (CALC)
CO2 SERPL-SCNC: 27 MMOL/L (ref 20–32)
CREAT SERPL-MCNC: 0.93 MG/DL (ref 0.7–1.3)
EGFRCR SERPLBLD CKD-EPI 2021: 99 ML/MIN/1.73M2
GLUCOSE SERPL-MCNC: 90 MG/DL (ref 65–99)
HDLC SERPL-MCNC: 97 MG/DL
LDLC SERPL CALC-MCNC: 110 MG/DL (CALC)
NONHDLC SERPL-MCNC: 125 MG/DL (CALC)
POTASSIUM SERPL-SCNC: 4.9 MMOL/L (ref 3.5–5.3)
PSA SERPL-MCNC: 1.52 NG/ML
SODIUM SERPL-SCNC: 139 MMOL/L (ref 135–146)
TRIGL SERPL-MCNC: 64 MG/DL

## 2025-05-02 ENCOUNTER — OFFICE VISIT (OUTPATIENT)
Dept: PAIN MEDICINE | Facility: CLINIC | Age: 55
End: 2025-05-02
Payer: COMMERCIAL

## 2025-05-02 DIAGNOSIS — M54.12 CERVICAL RADICULOPATHY: ICD-10-CM

## 2025-05-02 DIAGNOSIS — M54.16 LUMBAR RADICULOPATHY: Primary | ICD-10-CM

## 2025-05-02 PROCEDURE — G2211 COMPLEX E/M VISIT ADD ON: HCPCS | Performed by: PAIN MEDICINE

## 2025-05-02 PROCEDURE — 99213 OFFICE O/P EST LOW 20 MIN: CPT | Performed by: PAIN MEDICINE

## 2025-05-02 ASSESSMENT — PAIN DESCRIPTION - DESCRIPTORS: DESCRIPTORS: SHARP;ACHING;DULL

## 2025-05-02 ASSESSMENT — PAIN SCALES - GENERAL: PAINLEVEL_OUTOF10: 6

## 2025-05-02 ASSESSMENT — PAIN - FUNCTIONAL ASSESSMENT: PAIN_FUNCTIONAL_ASSESSMENT: 0-10

## 2025-05-02 NOTE — H&P
History Of Present Illness  Kobe Cisneros is a 54 y.o. male presenting with chronic neck pain  Patient completed of the physical therapy and did not believe the physical therapy has provided him with any significant improvement it actually made his symptoms worse continues to complain of the neck pain radiating to the shoulders worse on the left side rating the pain at a level of 8 out of 10 aggravated by work  He continues to be on the gabapentin Lodine and the tramadol.  Past Medical History  Medical History[1]  Surgical History  Surgical History[2]  Social History  He reports that he has been smoking cigarettes. He has a 60 pack-year smoking history. He has never used smokeless tobacco. He reports current alcohol use of about 36.0 standard drinks of alcohol per week. He reports that he does not use drugs.    Family History  Family History[3]     Allergies  Allergies[4]  Review of Systems   All 13 systems were reviewed and are within normal levels except as noted below or per HPI. Positive and pertinent negative responses are noted below or in the HPI   Denied any fever or chills. No weight loss and no night sweats. No cough or sputum production. No diarrhea   No constipation  No bladder and bowel incontinence and no other changes in bladder and bowel. No skin changes.   Denied opioids diversion and abuse and denies alcoholism. Denies overuse of  pain medications.   Physical Exam       Past medical history no interval changes has been noted    On physical examination    General   Alert, oriented x3 pleasant and cooperative. Does not look in any major distress.    HEENT  Pupils normal in size. Ears, nose, mouth, and throat appear to be in normal condition.  Head atraumatic      No signs of sedation or signs of withdrawal apparent.    Psychiatric   No signs of depression apparent.    Neuro   No focal neurological deficit apparent. Ambulation at baseline.      Respiratory  No respiratory distress     Abdomen  no  distention     Skin  No skin markings supportive of recent IV drug usage .    Cardiovascular  Regular rate and rhythm    Last Recorded Vitals  There were no vitals taken for this visit.  XR cervical spine complete 4-5 views  Result Date: 9/7/2024  Interpreted By:  Kyra Verdugo, STUDY: Cervical spine, five views.   INDICATION: Signs/Symptoms:pain left neck.   COMPARISON: None.   ACCESSION NUMBER(S): [id number]   ORDERING CLINICIAN: ALLY MOLINA   FINDINGS: Grade 1 C3-4 retrolisthesis. Mild C5-6 spondylosis with mild disc height loss and small osteophytes. Mild bilateral mid cervical osseous foraminal stenosis involving C4-5 and C5-6 levels on the left and C5-6 level on the right likely secondary to uncovertebral and facet degenerative changes. Vertebral body heights are preserved. Posterior elements are intact. Prevertebral soft tissues are unremarkable.       1. Multilevel cervical spine degenerative changes as above.   MACRO: None.   Signed by: Kyra Verdugo 9/7/2024 12:40 PM Dictation workstation:   [id number]      Assessment/Plan   54 years old with history and physical examination supportive of cervical spondylosis cervicalgia with cervical radiculopathy tried and failed conservative management with physical therapy and medical management    Plan  I recommended for the patient a cervical epidural steroid injection targeting the C7-T1 level with injection of contrast material to confirm needle placement to be performed under fluoroscopic guidance benefits and risk of the procedure were discussed with the patient and he would like to proceed I will reevaluate the patient after the performance of the epidural for further recommendation as his case progressed      The above clinical summary has been dictated with voice recognition software. It has not been proofread for grammatical errors, typographical mistakes, or other semantic inconsistencies.    Thank you for visiting our office today. It was our  pleasure to take part in your healthcare.     Please do not hesitate to contact the pain clinic after your visit with any questions or concerns at  M-F 8-4 pm       Ronni Arauz M.D.  Medical Director , Division of Pain Medicine [facility]   of Anesthesiology and Pain Medicine  [facility]     [place] [address] [address] [place] [address]     Office: [phone]  Fax: [phone]      Ronni Arauz MD       [1] No past medical history on file.  [2]   Past Surgical History:  Procedure Laterality Date    OTHER SURGICAL HISTORY  11/14/2022    Back surgery    OTHER SURGICAL HISTORY  11/14/2022    Knee surgery    OTHER SURGICAL HISTORY  11/14/2022    Hernia repair    OTHER SURGICAL HISTORY  11/14/2022    Surgery   [3]   Family History  Problem Relation Name Age of Onset    Diabetes Mother     [4] No Known Allergies

## 2025-05-02 NOTE — PROGRESS NOTES
Patient here for a follow up after completing physical therapy. He states he had no relief from therapy and it made his pain worse

## 2025-05-09 DIAGNOSIS — M54.42 CHRONIC BILATERAL LOW BACK PAIN WITH LEFT-SIDED SCIATICA: ICD-10-CM

## 2025-05-09 DIAGNOSIS — G89.29 CHRONIC BILATERAL LOW BACK PAIN WITH LEFT-SIDED SCIATICA: ICD-10-CM

## 2025-05-10 RX ORDER — TRAMADOL HYDROCHLORIDE 50 MG/1
100 TABLET ORAL EVERY 12 HOURS PRN
Qty: 120 TABLET | Refills: 0 | Status: SHIPPED | OUTPATIENT
Start: 2025-05-10

## 2025-05-27 ENCOUNTER — HOSPITAL ENCOUNTER (OUTPATIENT)
Dept: PAIN MEDICINE | Facility: CLINIC | Age: 55
Discharge: HOME | End: 2025-05-27
Payer: COMMERCIAL

## 2025-05-27 VITALS
DIASTOLIC BLOOD PRESSURE: 92 MMHG | OXYGEN SATURATION: 98 % | SYSTOLIC BLOOD PRESSURE: 144 MMHG | RESPIRATION RATE: 18 BRPM | HEART RATE: 86 BPM | TEMPERATURE: 97.5 F

## 2025-05-27 DIAGNOSIS — M54.12 CERVICAL RADICULOPATHY: ICD-10-CM

## 2025-05-27 PROCEDURE — 62321 NJX INTERLAMINAR CRV/THRC: CPT | Performed by: PAIN MEDICINE

## 2025-05-27 PROCEDURE — 2550000001 HC RX 255 CONTRASTS: Performed by: PAIN MEDICINE

## 2025-05-27 PROCEDURE — 2500000004 HC RX 250 GENERAL PHARMACY W/ HCPCS (ALT 636 FOR OP/ED): Mod: JZ | Performed by: PAIN MEDICINE

## 2025-05-27 RX ORDER — METHYLPREDNISOLONE ACETATE 80 MG/ML
INJECTION, SUSPENSION INTRA-ARTICULAR; INTRALESIONAL; INTRAMUSCULAR; SOFT TISSUE AS NEEDED
Status: COMPLETED | OUTPATIENT
Start: 2025-05-27 | End: 2025-05-27

## 2025-05-27 RX ORDER — LIDOCAINE HYDROCHLORIDE 10 MG/ML
INJECTION, SOLUTION EPIDURAL; INFILTRATION; INTRACAUDAL; PERINEURAL AS NEEDED
Status: COMPLETED | OUTPATIENT
Start: 2025-05-27 | End: 2025-05-27

## 2025-05-27 RX ADMIN — METHYLPREDNISOLONE ACETATE 80 MG: 80 INJECTION, SUSPENSION INTRA-ARTICULAR; INTRALESIONAL; INTRAMUSCULAR; SOFT TISSUE at 13:51

## 2025-05-27 RX ADMIN — IOHEXOL 1 ML: 300 INJECTION, SOLUTION INTRAVENOUS at 13:51

## 2025-05-27 RX ADMIN — LIDOCAINE HYDROCHLORIDE 2 ML: 10 INJECTION, SOLUTION EPIDURAL; INFILTRATION; INTRACAUDAL; PERINEURAL at 13:50

## 2025-05-27 ASSESSMENT — COLUMBIA-SUICIDE SEVERITY RATING SCALE - C-SSRS
1. IN THE PAST MONTH, HAVE YOU WISHED YOU WERE DEAD OR WISHED YOU COULD GO TO SLEEP AND NOT WAKE UP?: NO
6. HAVE YOU EVER DONE ANYTHING, STARTED TO DO ANYTHING, OR PREPARED TO DO ANYTHING TO END YOUR LIFE?: NO
2. HAVE YOU ACTUALLY HAD ANY THOUGHTS OF KILLING YOURSELF?: NO

## 2025-05-27 ASSESSMENT — PAIN - FUNCTIONAL ASSESSMENT: PAIN_FUNCTIONAL_ASSESSMENT: 0-10

## 2025-05-27 ASSESSMENT — PAIN SCALES - GENERAL: PAINLEVEL_OUTOF10: 7

## 2025-05-27 ASSESSMENT — PAIN DESCRIPTION - DESCRIPTORS: DESCRIPTORS: ACHING

## 2025-05-27 NOTE — DISCHARGE INSTRUCTIONS
Post-injection instructions:    Your pain may not be gone immediately after the procedure--it usually takes the steroid 3-5 days to start working.   It may take several weeks for the medicine to reach its' full effect.   Pay attention to how much pain relief (what percentage compared to before the procedure) you get and for how long it lasts.     Activity: Avoid strenuous activity for 24 hours. After that return to your normal activity level.     Bandages: Remove after 24 hours     Showering/Bathing: You may shower after bandage is removed     Follow up: CALL OFFICE IN 7 DAYS [phone] LEAVE MESSAGE ABOUT THE RELIEF THAT WAS OBTAINED      Call the doctor immediately: if you notice:     Excessive bleeding from procedure site (brisk bright red bleeding from the site or bleeding that soaks the bandages or does not stop)   Severe headache  Inability to walk, leg or arm weakness or numbness that is worse after the procedure   Uncontrolled pain   New urinary or fecal incontinence   Signs of infection: Fever above 101.5F, redness, swelling, pus or drainage from the site    Epidural Injection    Why is this procedure done?  With an epidural injection, the doctor injects drugs deep into the area around your spinal cord. This is different than epidural anesthesia that is used for surgery or when a woman has a baby. Your spine is a group of bones in your back that protect the nerves in your spinal cord. Problems with your spine can cause swollen nerves in the spinal cord. This swelling leads to pain and can limit movement. In an epidural injection, the doctor may give you a drug to help with swelling and pain.  You may have an epidural injection in different parts of your back, based on where your pain is. For pain in your head or arms, you may have a cervical epidural injection. If your pain is in your upper or middle back, you may get a thoracic epidural injection. For pain in your lower back or legs, you may get a  lumbar epidural injection.    What will the results be?  The treatment may:  Lower pain  Reduce swelling in the nerves  Improve movement  What happens before the procedure?  Your doctor will take your history. Talk to the doctor about:  All the drugs you are taking. Be sure to include all prescription and over-the-counter (OTC) drugs, and herbal supplements. Tell the doctor about any drug allergy. Bring a list of drugs you take with you.  If you have high blood sugar or diabetes. Your drugs may need to be changed.  Any bleeding problems. Be sure to tell your doctor if you are taking any drugs that may cause bleeding. Some of these are warfarin, rivaroxaban, apixaban, ticagrelor, clopidogrel, ketorolac, ibuprofen, naproxen, or aspirin. Certain vitamins and herbs, such as garlic and fish oil, may also add to the risk for bleeding. You may need to stop these drugs as well. Talk to your doctor about them.  Tell the doctor if you are pregnant.  You will not be allowed to drive right away after the procedure. Ask a family member or a friend to drive you home.  What happens during the procedure?  To help the doctor make sure the drugs are being injected in the right place, your doctor may do an x-ray of your spine. Other times your doctor may do a continuous x-ray during the procedure. This is a fluoroscopy. The doctor may also use a colored dye or a contrast dye to check where to inject the drug.  You may be given a drug to help you relax. You may be given a drug to make the area of the injection numb.  The doctor will clean the skin on your back or neck. This helps prevent infection.  The doctor will put a needle through the skin toward your spine. The drug will be injected into a space near the spine.  The needle will be taken out and a bandage will be placed over the injection site.  What happens after the procedure?  Staff will check on you to make sure you are doing well. They will tell you when you can go  home.  What care is needed at home?  Relax on the day of the injection.  Do not drive or run machines for at least 12 hours afterwards.  Apply ice to the injection site. Place an ice pack or a bag of frozen peas wrapped in a towel over the painful part. Never put ice right on the skin. Do not leave the ice on more than 10 to 15 minutes at a time.  It may take a few days before you will feel the effects of the injection.  What follow-up care is needed?  Your doctor may ask you to make visits to the office to check on your progress. Be sure to keep these visits. You may also need to see a physical therapist (PT). The PT will teach you exercises to help you get back your strength and motion. Ask your doctor when you can exercise.  What problems could happen?  Bleeding  Infection (rare)  Headache  Nerve injury  If you have diabetes, your blood sugar can go up after the injection. Check with your doctor if you need more treatment for this.  Last Reviewed Date

## 2025-06-18 ENCOUNTER — APPOINTMENT (OUTPATIENT)
Dept: PRIMARY CARE | Facility: CLINIC | Age: 55
End: 2025-06-18
Payer: COMMERCIAL

## 2025-06-18 VITALS
OXYGEN SATURATION: 95 % | DIASTOLIC BLOOD PRESSURE: 78 MMHG | TEMPERATURE: 98.1 F | HEIGHT: 70 IN | HEART RATE: 88 BPM | WEIGHT: 190.3 LBS | BODY MASS INDEX: 27.24 KG/M2 | SYSTOLIC BLOOD PRESSURE: 122 MMHG

## 2025-06-18 DIAGNOSIS — G89.29 CHRONIC HAND PAIN, LEFT: ICD-10-CM

## 2025-06-18 DIAGNOSIS — F17.210 CIGARETTE SMOKER: ICD-10-CM

## 2025-06-18 DIAGNOSIS — G89.29 CHRONIC BILATERAL LOW BACK PAIN WITH LEFT-SIDED SCIATICA: ICD-10-CM

## 2025-06-18 DIAGNOSIS — J44.9 CHRONIC OBSTRUCTIVE PULMONARY DISEASE, UNSPECIFIED COPD TYPE (MULTI): ICD-10-CM

## 2025-06-18 DIAGNOSIS — M79.641 CHRONIC HAND PAIN, RIGHT: ICD-10-CM

## 2025-06-18 DIAGNOSIS — M54.42 CHRONIC BILATERAL LOW BACK PAIN WITH LEFT-SIDED SCIATICA: ICD-10-CM

## 2025-06-18 DIAGNOSIS — G89.29 CHRONIC HAND PAIN, RIGHT: ICD-10-CM

## 2025-06-18 DIAGNOSIS — I10 PRIMARY HYPERTENSION: Primary | ICD-10-CM

## 2025-06-18 DIAGNOSIS — M79.642 CHRONIC HAND PAIN, LEFT: ICD-10-CM

## 2025-06-18 DIAGNOSIS — M54.2 NECK PAIN: ICD-10-CM

## 2025-06-18 DIAGNOSIS — S46.819D STRAIN OF TRAPEZIUS MUSCLE, UNSPECIFIED LATERALITY, SUBSEQUENT ENCOUNTER: ICD-10-CM

## 2025-06-18 PROBLEM — S46.819A TRAPEZIUS STRAIN: Status: ACTIVE | Noted: 2025-06-18

## 2025-06-18 PROCEDURE — 3008F BODY MASS INDEX DOCD: CPT | Performed by: FAMILY MEDICINE

## 2025-06-18 PROCEDURE — 99214 OFFICE O/P EST MOD 30 MIN: CPT | Performed by: FAMILY MEDICINE

## 2025-06-18 PROCEDURE — 3074F SYST BP LT 130 MM HG: CPT | Performed by: FAMILY MEDICINE

## 2025-06-18 PROCEDURE — 3078F DIAST BP <80 MM HG: CPT | Performed by: FAMILY MEDICINE

## 2025-06-18 RX ORDER — AMLODIPINE AND BENAZEPRIL HYDROCHLORIDE 5; 10 MG/1; MG/1
1 CAPSULE ORAL DAILY
Qty: 90 CAPSULE | Refills: 0 | Status: SHIPPED | OUTPATIENT
Start: 2025-06-18

## 2025-06-18 RX ORDER — TRAMADOL HYDROCHLORIDE 50 MG/1
100 TABLET, FILM COATED ORAL EVERY 12 HOURS PRN
Qty: 120 TABLET | Refills: 0 | Status: SHIPPED | OUTPATIENT
Start: 2025-06-18

## 2025-06-18 RX ORDER — GABAPENTIN 300 MG/1
900 CAPSULE ORAL 3 TIMES DAILY
Qty: 810 CAPSULE | Refills: 0 | Status: SHIPPED | OUTPATIENT
Start: 2025-06-18

## 2025-06-18 RX ORDER — ETODOLAC 400 MG/1
400 TABLET, EXTENDED RELEASE ORAL DAILY
Qty: 90 TABLET | Refills: 0 | Status: SHIPPED | OUTPATIENT
Start: 2025-06-18

## 2025-06-18 RX ORDER — ALBUTEROL SULFATE 90 UG/1
2 INHALANT RESPIRATORY (INHALATION) EVERY 4 HOURS PRN
Qty: 18 G | Refills: 0 | Status: SHIPPED | OUTPATIENT
Start: 2025-06-18 | End: 2026-06-18

## 2025-06-18 ASSESSMENT — LIFESTYLE VARIABLES: TOTAL SCORE: 0

## 2025-06-18 NOTE — PATIENT INSTRUCTIONS
Follow up in 3 months.    It was a pleasure to see you today. Thank you for choosing us for your health care needs.    If you have lab or other testing completed and have not been informed of results within one week, please call the office for your results.    If you haven't done so, consider signing up for [facility] [employer], the [facility] personal health record. Ask the staff how you can get started.

## 2025-06-18 NOTE — PROGRESS NOTES
Subjective   Patient ID: Kobe Cisneros is a 55 y.o. male who presents for Follow-up.    HPI     No new concerns     Patient had an appt with pain management 5/27/2025 and received a cervical epidural steroid injection targeting the C7-T1 level.   Patient states the injection worked for 3 weeks but the pain is now returning.  He states he still experienced a sharp pain when turning his head after receiving the injection.   Patient will follow up with pain management regarding the return of the pain.       Labs&PSA: 4/12/2025  Cologuard- Due       Patient has hypertension.  Patient does not monitor BP at home.    Denies CP, SOB, dizziness, and LE edema.    Patient is compliant with antihypertensive therapy and denies any noted side effects.     He has chronic back pain.   Back pain is unchanged in severity.  Symptoms have been controlled with the current medications.  The pain has not been radiating and is localized to the lower back.  He does have a hx of past lumbar surgery.  Patient denies any lower extremity weakness or incoordination.  He denies any saddle anesthesia.  He denies any bowel or bladder dysfunction.       Has also has chronic bilat hand pain.  Pain is persistent but stable on current medications.     He has COPD.  No exacerbations.  Uses albuterol as needed.  Continues to smoke.        OARRS:  Francisco Ashford DO on 6/18/2025  8:44 AM  I have personally reviewed the OARRS report for Kobe Cisneros. I have considered the risks of abuse, dependence, addiction and diversion and I believe that it is clinically appropriate for Kobe Cisneros to be prescribed this medication    Is the patient prescribed a combination of a benzodiazepine and opioid?  No    Last Urine Drug Screen / ordered today: No  Recent Results (from the past 8760 hours)   Drug Screen, Urine With Reflex to Confirmation    Collection Time: 03/11/25  5:42 PM   Result Value Ref Range    Amphetamines NEGATIVE <500 ng/mL    Barbiturates  NEGATIVE <300 ng/mL    Benzodiazepines NEGATIVE <100 ng/mL    Cocaine Metabolite NEGATIVE <150 ng/mL    Marijuana Metabolite NEGATIVE <20 ng/mL    Methadone Metabolite NEGATIVE <100 ng/mL    Opiates NEGATIVE <100 ng/mL    Oxycodone NEGATIVE <100 ng/mL    Phencyclidine NEGATIVE <25 ng/mL    Creatinine 46.9 > or = 20.0 mg/dL    pH 7.5 4.5 - 9.0    Oxidant NEGATIVE <200 mcg/mL    Notes and Comments       Results are as expected.       Controlled Substance Agreement:  Date of the Last Agreement: 3/11/2025  Reviewed Controlled Substance Agreement including but not limited to the benefits, risks, and alternatives to treatment with a Controlled Substance medication(s).    Opioids:  What is the patient's goal of therapy? Reduce pain   Is this being achieved with current treatment? yes    I have calculated the patient's Morphine Dose Equivalent (MED):   I have considered referral to Pain Management and/or a specialist, and do not feel it is necessary at this time.    I feel that it is clinically indicated to continue this current medication regimen after consideration of alternative therapies, and other non-opioid treatment.    Opioid Risk Screening:  Family History of Substance Abuse  Alcohol: 0 (6/18/2025  8:00 AM)  Illegal Drugs: 0 (6/18/2025  8:00 AM)  Prescription Drugs: 0 (6/18/2025  8:00 AM)    Personal History of Substance Abuse  Alcohol: 0 (6/18/2025  8:00 AM)  Illegal Drugs: 0 (6/18/2025  8:00 AM)  Prescription Drugs: 0 (6/18/2025  8:00 AM)    Patient Age (16-45)  Age (16-45): 0 (6/18/2025  8:00 AM)    History of Preadolescent Sexual Abuse  History of Preadolescent Sexual Abuse: .0 (6/18/2025  8:00 AM)    Psychological Disease  Attention Deficit Disorder, Obsessive Compulsive Disorder, Bipolar, Schizophrenia: 0 (6/18/2025  8:00 AM)  Depression: 0 (6/18/2025  8:00 AM)    Total Score  Total Score: 0 (6/18/2025  8:00 AM)    Total Score Risk Category  TOTAL SCORE CATEGORY: Low Risk (0-3) (6/18/2025  8:00  AM)        Pain Assessment:  Analgesia  What was your pain level on average during the past week?: 3  What was your pain level at its worst during the past week?: 7  What percentage of your pain has been relieved during the past week?: 35 %  Is the amount of pain relief you are now obtaining from your current pain relievers enough to make a real difference in your life?: Y  Query to Clinician: Is the patient's pain relief clinically significant?: Yes    Activities of Daily Living  Physical Functioning: Same  Family Relationships: Same  Social Relationships: Same  Mood: Same  Sleep Patterns: Same  Overall Functioning: Same    Adverse Events  Is patient experiencing any side effects from current pain relievers?: N  Patient's Overall Severity of Side Effects: None      Assessment  Is your overall impression that this patient is benefiting from opioid therapy?: Yes  Specific Analgesic Plan: Continue present regimen          Review of Systems  Constitutional: Patient denies any fever, chills, loss of appetite, or unexplained weight loss.  Cardiovascular: Patient denies any chest pain, shortness of breath with exertion, tachycardia, palpitations, orthopnea, or paroxysmal nocturnal dyspnea.  Respiratory: Patient denies any cough, shortness of breath, or wheezing.  Gastrointestinal: Patient denies any nausea, vomiting, diarrhea, constipation, melena, hematochezia, or reflux symptoms  Skin: Denies any rashes or skin lesions  Neurology: Patient denies any new motor or sensory losses. Denies any numbness, tingling, weakness, and incoordination of the extremities. Patient also denies any tremor, seizures, or gait instability.  Endocrinology: Denies any polyuria, polydipsia, polyphagia, or heat/cold intolerance.  Psychiatric: Patient denies any depression, or suicidal/homicidal ideation. Anxiety symptoms have been stable with the current medication.    Objective   /78 (BP Location: Right arm, Patient Position: Sitting, BP  "Cuff Size: Adult)   Pulse 88   Temp 36.7 °C (98.1 °F) (Temporal)   Ht 1.778 m (5' 10\")   Wt 86.3 kg (190 lb 4.8 oz)   SpO2 95%   BMI 27.31 kg/m²     Physical Exam  General Appearance: Alert and cooperative, in no acute distress, well-developed/well-nourished overweight male.    Neck: Supple and without adenopathy or rigidity. There is no JVD at 90° and no carotid bruits are noted. There is no thyromegaly, thyroid tenderness, or palpable thyroid nodules.  Heart: Regular rate and rhythm without murmur or ectopy.  Lungs: Clear to auscultation bilaterally with good air exchange.  Skin: Good turgor, moist, warm and without rashes or lesions.  Neurological exam: Alert and oriented ×3, no tremor, normal gait.  Extremities: No clubbing, cyanosis, or edema  Psychiatric: Appropriate mood and affect, good insight and judgment, no delusions or thought disorders, no suicidal or homicidal ideation    Assessment/Plan     HTN:    Blood pressure appears adequately controlled and we will continue with the current antihypertensive therapy.    Chronic bilateral lower back pain with left-sided sciatica:  Symptoms have remained stable but persistent.  Pt has seen pain mgmt who recommended physical therapy.  12/11/2024: We changed gabapentin to the 300 mg capsules. He will take 3 capsules three times per day (900 mg per dose).   Recommended he follow up with pain management after the completion of physical therapy.   3/11/2025: Recommended follow up with pain management as well.  6/18/2025: Will continue the current medication.    Chronic left hand pain:  Stable with the current treatment regimen.  Will continue the current plan.    Chronic right hand pain:  Stable with the current treatment regimen.  Will continue the current plan.    COPD:  Stable based on symptoms.  Will continue albuterol as needed.  Once again recommended smoking cessation but he declines any assistance.    Neck pain:  9/6/2024 x-ray reveals degenerative " changes.  Symptoms have continued.  Physical therapy was not helpful.  Pt had an appointment with Dr. Arauz (pain management) on 10/2/2024. Pt was advised to complete 6 to 8 weeks of physical therapy before considering steroid injections.   3/11/2025: Patient had five total sessions of physical therapy that did not help his neck pain.   5/27/2025: Received a cervical epidural steroid injection targeting the C7-T1 level.   Patient reports the injection worked for 3 weeks but the pain is now returning.  Patient will follow up with pain management regarding the return of the pain.        Strain of left trapezius muscle:  Pain is stable but persistent.   Pt is currently going to Physical Therapy that does not seem to be helping.   5/27/2025: Received a cervical epidural steroid injection targeting the C7-T1 level.   Patient reports the injection worked for 3 weeks but the pain is now returning.  Pt will follow up with pain management.     Cigarette smoker:  Patient understands that continued smoking will increase the risks of lung disease, vascular disease, and multiple malignancies.  Pt. has been encouraged to work on smoking cessation and available smoking cessation aids were discussed.  Declines any assistance at this time.        Scribe Attestation  By signing my name below, IDieter , Theodore   attest that this documentation has been prepared under the direction and in the presence of Francisco Ashford DO.    Requested Prescriptions     Signed Prescriptions Disp Refills    traMADol (Ultram) 50 mg tablet 120 tablet 0     Sig: Take 2 tablets (100 mg) by mouth every 12 hours if needed for severe pain (7 - 10).    gabapentin (Neurontin) 300 mg capsule 810 capsule 0     Sig: Take 3 capsules (900 mg) by mouth 3 times a day.    albuterol (ProAir HFA) 90 mcg/actuation inhaler 18 g 0     Sig: Inhale 2 puffs every 4 hours if needed for wheezing or shortness of breath.    amLODIPine-benazepriL (Lotrel) 5-10 mg capsule  90 capsule 0     Sig: Take 1 capsule by mouth once daily.    etodolac XL (Lodine XL) 400 mg 24 hr tablet 90 tablet 0     Sig: Take 1 tablet (400 mg) by mouth once daily. Take with food

## 2025-07-07 ENCOUNTER — HOSPITAL ENCOUNTER (EMERGENCY)
Age: 55
Discharge: HOME | End: 2025-07-07
Attending: STUDENT IN AN ORGANIZED HEALTH CARE EDUCATION/TRAINING PROGRAM
Payer: COMMERCIAL

## 2025-07-07 ENCOUNTER — APPOINTMENT (OUTPATIENT)
Dept: GENERAL RADIOLOGY | Age: 55
End: 2025-07-07
Payer: COMMERCIAL

## 2025-07-07 VITALS
BODY MASS INDEX: 27.2 KG/M2 | TEMPERATURE: 98.4 F | DIASTOLIC BLOOD PRESSURE: 87 MMHG | HEART RATE: 79 BPM | OXYGEN SATURATION: 96 % | RESPIRATION RATE: 18 BRPM | SYSTOLIC BLOOD PRESSURE: 129 MMHG | WEIGHT: 190 LBS | HEIGHT: 70 IN

## 2025-07-07 DIAGNOSIS — R07.9 CHEST PAIN, UNSPECIFIED TYPE: Primary | ICD-10-CM

## 2025-07-07 LAB
ALBUMIN SERPL-MCNC: 4.6 G/DL (ref 3.5–4.6)
ALP SERPL-CCNC: 92 U/L (ref 35–104)
ALT SERPL-CCNC: 18 U/L (ref 0–41)
ANION GAP SERPL CALCULATED.3IONS-SCNC: 12 MEQ/L (ref 9–15)
AST SERPL-CCNC: 18 U/L (ref 0–40)
BASOPHILS # BLD: 0.1 K/UL (ref 0–0.2)
BASOPHILS NFR BLD: 1.4 %
BILIRUB SERPL-MCNC: 0.3 MG/DL (ref 0.2–0.7)
BUN SERPL-MCNC: 17 MG/DL (ref 6–20)
CALCIUM SERPL-MCNC: 9.4 MG/DL (ref 8.5–9.9)
CHLORIDE SERPL-SCNC: 98 MEQ/L (ref 95–107)
CO2 SERPL-SCNC: 24 MEQ/L (ref 20–31)
CREAT SERPL-MCNC: 1.04 MG/DL (ref 0.7–1.2)
D DIMER PPP FEU-MCNC: 0.42 MG/L FEU (ref 0–0.5)
EOSINOPHIL # BLD: 0.2 K/UL (ref 0–0.7)
EOSINOPHIL NFR BLD: 2.4 %
ERYTHROCYTE [DISTWIDTH] IN BLOOD BY AUTOMATED COUNT: 14.8 % (ref 11.5–14.5)
ERYTHROCYTE [SEDIMENTATION RATE] IN BLOOD BY WESTERGREN METHOD: 12 MM (ref 0–20)
GLOBULIN SER CALC-MCNC: 2.9 G/DL (ref 2.3–3.5)
GLUCOSE SERPL-MCNC: 83 MG/DL (ref 70–99)
HCT VFR BLD AUTO: 45.3 % (ref 42–52)
HGB BLD-MCNC: 15.6 G/DL (ref 14–18)
LYMPHOCYTES # BLD: 1.9 K/UL (ref 1–4.8)
LYMPHOCYTES NFR BLD: 20.6 %
MCH RBC QN AUTO: 29.6 PG (ref 27–31.3)
MCHC RBC AUTO-ENTMCNC: 34.4 % (ref 33–37)
MCV RBC AUTO: 86 FL (ref 79–92.2)
MONOCYTES # BLD: 0.9 K/UL (ref 0.2–0.8)
MONOCYTES NFR BLD: 10.2 %
NEUTROPHILS # BLD: 6 K/UL (ref 1.4–6.5)
NEUTS SEG NFR BLD: 65.2 %
PLATELET # BLD AUTO: 321 K/UL (ref 130–400)
POTASSIUM SERPL-SCNC: 4.1 MEQ/L (ref 3.4–4.9)
PROT SERPL-MCNC: 7.5 G/DL (ref 6.3–8)
RBC # BLD AUTO: 5.27 M/UL (ref 4.7–6.1)
SODIUM SERPL-SCNC: 134 MEQ/L (ref 135–144)
TROPONIN, HIGH SENSITIVITY: 9 NG/L (ref 0–19)
TROPONIN, HIGH SENSITIVITY: 9 NG/L (ref 0–19)
WBC # BLD AUTO: 9.1 K/UL (ref 4.8–10.8)

## 2025-07-07 PROCEDURE — 6360000002 HC RX W HCPCS: Performed by: STUDENT IN AN ORGANIZED HEALTH CARE EDUCATION/TRAINING PROGRAM

## 2025-07-07 PROCEDURE — 96374 THER/PROPH/DIAG INJ IV PUSH: CPT

## 2025-07-07 PROCEDURE — 85652 RBC SED RATE AUTOMATED: CPT

## 2025-07-07 PROCEDURE — 93005 ELECTROCARDIOGRAM TRACING: CPT | Performed by: STUDENT IN AN ORGANIZED HEALTH CARE EDUCATION/TRAINING PROGRAM

## 2025-07-07 PROCEDURE — 99285 EMERGENCY DEPT VISIT HI MDM: CPT

## 2025-07-07 PROCEDURE — 6370000000 HC RX 637 (ALT 250 FOR IP): Performed by: STUDENT IN AN ORGANIZED HEALTH CARE EDUCATION/TRAINING PROGRAM

## 2025-07-07 PROCEDURE — 80053 COMPREHEN METABOLIC PANEL: CPT

## 2025-07-07 PROCEDURE — 85025 COMPLETE CBC W/AUTO DIFF WBC: CPT

## 2025-07-07 PROCEDURE — 71045 X-RAY EXAM CHEST 1 VIEW: CPT

## 2025-07-07 PROCEDURE — 36415 COLL VENOUS BLD VENIPUNCTURE: CPT

## 2025-07-07 PROCEDURE — 84484 ASSAY OF TROPONIN QUANT: CPT

## 2025-07-07 PROCEDURE — 85379 FIBRIN DEGRADATION QUANT: CPT

## 2025-07-07 RX ORDER — KETOROLAC TROMETHAMINE 15 MG/ML
15 INJECTION, SOLUTION INTRAMUSCULAR; INTRAVENOUS ONCE
Status: COMPLETED | OUTPATIENT
Start: 2025-07-07 | End: 2025-07-07

## 2025-07-07 RX ORDER — GABAPENTIN 300 MG/1
900 CAPSULE ORAL 3 TIMES DAILY
COMMUNITY
Start: 2025-06-18

## 2025-07-07 RX ORDER — FAMOTIDINE 20 MG/1
20 TABLET, FILM COATED ORAL ONCE
Status: COMPLETED | OUTPATIENT
Start: 2025-07-07 | End: 2025-07-07

## 2025-07-07 RX ORDER — ETODOLAC 400 MG/1
400 TABLET, EXTENDED RELEASE ORAL DAILY
COMMUNITY
Start: 2025-06-18

## 2025-07-07 RX ORDER — AMLODIPINE AND BENAZEPRIL HYDROCHLORIDE 5; 10 MG/1; MG/1
1 CAPSULE ORAL DAILY
COMMUNITY
Start: 2025-06-18

## 2025-07-07 RX ADMIN — FAMOTIDINE 20 MG: 20 TABLET, FILM COATED ORAL at 19:46

## 2025-07-07 RX ADMIN — KETOROLAC TROMETHAMINE 15 MG: 15 INJECTION, SOLUTION INTRAMUSCULAR; INTRAVENOUS at 19:47

## 2025-07-07 ASSESSMENT — PAIN - FUNCTIONAL ASSESSMENT
PAIN_FUNCTIONAL_ASSESSMENT: 0-10
PAIN_FUNCTIONAL_ASSESSMENT: 0-10

## 2025-07-07 ASSESSMENT — PAIN DESCRIPTION - PAIN TYPE: TYPE: ACUTE PAIN

## 2025-07-07 ASSESSMENT — PAIN DESCRIPTION - ORIENTATION: ORIENTATION: ANTERIOR

## 2025-07-07 ASSESSMENT — PAIN SCALES - GENERAL
PAINLEVEL_OUTOF10: 6
PAINLEVEL_OUTOF10: 4

## 2025-07-07 ASSESSMENT — PAIN DESCRIPTION - LOCATION: LOCATION: CHEST;BACK

## 2025-07-07 ASSESSMENT — PAIN DESCRIPTION - FREQUENCY: FREQUENCY: CONTINUOUS

## 2025-07-07 ASSESSMENT — PAIN DESCRIPTION - DESCRIPTORS: DESCRIPTORS: TIGHTNESS

## 2025-07-07 NOTE — ED TRIAGE NOTES
Pt to ER with C/O anterior chest pain x 2-3 hours. Denies SOB, reports associated recent URI sx's w/ cough. No Hx cardiac. Took 81 mg baby ASA

## 2025-07-07 NOTE — ED PROVIDER NOTES
[facility] EMERGENCY DEPARTMENT  Emergency Department Encounter  Emergency Medicine      Pt Name: Theo Palomo  MRN:[id number]  Birthdate 1970  Date of evaluation: 7/7/25  Time: 7:22 PM EDT  PCP:  Lalo Woodall DO    CHIEF COMPLAINT       Chief Complaint   Patient presents with    Chest Pain       HISTORY OF PRESENT ILLNESS  (Location/Symptom, Timing/Onset, Context/Setting, Quality, Duration, ModifyingFactors, Severity.)      Theo Palomo is a 55 y.o. male with PMH of COPD, smoker presents with substernal left-sided chest pain started a few hours ago while he was driving.  He said it was very sharp, lasted for 2 to 3 hours.  He said he was able to drive home and then since then and his symptoms decreased, it is barely there at all now.  His wife made him come in to get checked out.  He did take a baby aspirin at home.  No other associated symptoms at that time.  He recently got over a cough and cold last week.  He denies any cardiac history not on blood thinners denies drug use, he does drink alcohol daily.  He denies any nausea vomiting no abdominal pain no back pain no lightheadedness no fevers chills no leg swelling or calf pain history of DVT or PE history of cancer or recent surgery or long travel.    PAST MEDICAL / SURGICAL / SOCIAL /FAMILY HISTORY      has a past medical history of Arthritis, COPD (chronic obstructive pulmonary disease) (HCC), Knee pain, Lower back pain, Tobacco abuse, and Ventral hernia.  No other pertinent PMH on review with patient/guardian.     has a past surgical history that includes knee surgery (11 years ago); hernia repair; and pr repair first abdominal wall hernia (N/A, 1/9/2018).  No other pertinent PSH on review with patient/guardian.  Social History     Socioeconomic History    Marital status:      Spouse name: Not on file    Number of children: Not on file    Years of education: Not on file    Highest education level: Not on file   Occupational History

## 2025-07-08 LAB
EKG ATRIAL RATE: 83 BPM
EKG DIAGNOSIS: NORMAL
EKG P AXIS: 48 DEGREES
EKG P-R INTERVAL: 162 MS
EKG Q-T INTERVAL: 342 MS
EKG QRS DURATION: 86 MS
EKG QTC CALCULATION (BAZETT): 401 MS
EKG R AXIS: 44 DEGREES
EKG T AXIS: 49 DEGREES
EKG VENTRICULAR RATE: 83 BPM

## 2025-07-08 NOTE — ED NOTES
Patient ambulated to restroom unassisted, steady gait noted. Patient reports increase of CP when ambulating.

## 2025-07-14 ENCOUNTER — TELEPHONE (OUTPATIENT)
Dept: PRIMARY CARE | Facility: CLINIC | Age: 55
End: 2025-07-14
Payer: COMMERCIAL

## 2025-07-14 NOTE — TELEPHONE ENCOUNTER
We could see him tomorrow at 11:20 for a hospital follow up.  Please clarify what medications they sent him home with.

## 2025-07-15 ENCOUNTER — PATIENT OUTREACH (OUTPATIENT)
Dept: PRIMARY CARE | Facility: CLINIC | Age: 55
End: 2025-07-15

## 2025-07-15 ENCOUNTER — OFFICE VISIT (OUTPATIENT)
Dept: PRIMARY CARE | Facility: CLINIC | Age: 55
End: 2025-07-15
Payer: COMMERCIAL

## 2025-07-15 VITALS
BODY MASS INDEX: 27.87 KG/M2 | DIASTOLIC BLOOD PRESSURE: 89 MMHG | HEIGHT: 70 IN | SYSTOLIC BLOOD PRESSURE: 131 MMHG | TEMPERATURE: 97.7 F | WEIGHT: 194.7 LBS | OXYGEN SATURATION: 93 % | HEART RATE: 87 BPM

## 2025-07-15 DIAGNOSIS — M54.42 CHRONIC BILATERAL LOW BACK PAIN WITH LEFT-SIDED SCIATICA: ICD-10-CM

## 2025-07-15 DIAGNOSIS — G89.29 CHRONIC BILATERAL LOW BACK PAIN WITH LEFT-SIDED SCIATICA: ICD-10-CM

## 2025-07-15 DIAGNOSIS — J18.9 PNEUMONIA OF LEFT LUNG DUE TO INFECTIOUS ORGANISM, UNSPECIFIED PART OF LUNG: ICD-10-CM

## 2025-07-15 DIAGNOSIS — Z09 HOSPITAL DISCHARGE FOLLOW-UP: Primary | ICD-10-CM

## 2025-07-15 PROCEDURE — 3075F SYST BP GE 130 - 139MM HG: CPT | Performed by: FAMILY MEDICINE

## 2025-07-15 PROCEDURE — 3008F BODY MASS INDEX DOCD: CPT | Performed by: FAMILY MEDICINE

## 2025-07-15 PROCEDURE — 3079F DIAST BP 80-89 MM HG: CPT | Performed by: FAMILY MEDICINE

## 2025-07-15 PROCEDURE — 99495 TRANSJ CARE MGMT MOD F2F 14D: CPT | Performed by: FAMILY MEDICINE

## 2025-07-15 RX ORDER — TRAMADOL HYDROCHLORIDE 50 MG/1
100 TABLET, FILM COATED ORAL EVERY 12 HOURS PRN
Qty: 120 TABLET | Refills: 0 | Status: SHIPPED | OUTPATIENT
Start: 2025-07-15

## 2025-07-15 RX ORDER — AZITHROMYCIN 250 MG/1
TABLET, FILM COATED ORAL
Qty: 6 TABLET | Refills: 0 | Status: SHIPPED | OUTPATIENT
Start: 2025-07-15 | End: 2025-07-19 | Stop reason: ALTCHOICE

## 2025-07-15 RX ORDER — AMOXICILLIN AND CLAVULANATE POTASSIUM 875; 125 MG/1; MG/1
875 TABLET, FILM COATED ORAL 2 TIMES DAILY
Qty: 20 TABLET | Refills: 0 | Status: SHIPPED | OUTPATIENT
Start: 2025-07-15 | End: 2025-07-25

## 2025-07-15 NOTE — LETTER
July 15, 2025     Patient: Kobe Cisneros   YOB: 1970   Date of Visit: 7/15/2025       To Whom It May Concern:    Kobe Cisneros was seen in my clinic on 7/15/2025 at 11:20 am. Please excuse Kobe for his absence from work for 7/14/2025 through 7/17/2025.    If you have any questions or concerns, please don't hesitate to call.         Sincerely,         Francisco Ashford,         CC: No Recipients

## 2025-07-15 NOTE — PATIENT INSTRUCTIONS
Follow up on Saturday as we discussed.    Work on not smoking.    Take both antibiotics.    It was a pleasure to see you today. Thank you for choosing us for your health care needs.    If you have lab or other testing completed and have not been informed of results within one week, please call the office for your results.    If you haven't done so, consider signing up for [facility] [employer], the [facility] personal health record. Ask the staff how you can get started.

## 2025-07-15 NOTE — PROGRESS NOTES
"Patient: Kobe Cisneros  : 1970  PCP: Francisco Ashford DO  MRN: [id number]  Program: Transitional Care Management  Status: Enrolled  Effective Dates: 7/15/2025 - present  Responsible Staff: Maraim Palacios RN  Social Drivers to be Addressed: No information to display         Kobe Cisneros is a 55 y.o. male presenting today for follow-up after being discharged from the hospital 2 days ago. The main problem requiring admission was COPD/ Pneumonia. The discharge summary and/or Transitional Care Management documentation was reviewed. Medication reconciliation was performed and his medication list was updated.     Kobe Cisneros was contacted by Transitional Care Management services within two days after his discharge. This encounter and supporting documentation was reviewed.    Patient reports he left AMA due to prohibiting him to leave room and \"walk around\"    He has not been on any antibiotics since leaving the hospital.   States his chest hurts and has left-sided chest pain.   Denies fever.     Discharge Facility: [facility]  Discharge Diagnosis: Acute pneumonia   Admission Date: 25  Discharge Date: 25      OARRS:  Francisco Ashford DO on 7/15/2025 12:22 PM  I have personally reviewed the OARRS report for Kobe Cisneros. I have considered the risks of abuse, dependence, addiction and diversion and I believe that it is clinically appropriate for Kobe Cisneros to be prescribed this medication    Is the patient prescribed a combination of a benzodiazepine and opioid?  No    Last Urine Drug Screen / ordered today: No  Recent Results (from the past 8760 hours)   Drug Screen, Urine With Reflex to Confirmation    Collection Time: 25  5:42 PM   Result Value Ref Range    Amphetamines NEGATIVE <500 ng/mL    Barbiturates NEGATIVE <300 ng/mL    Benzodiazepines NEGATIVE <100 ng/mL    Cocaine Metabolite NEGATIVE <150 ng/mL    Marijuana Metabolite NEGATIVE <20 ng/mL    Methadone Metabolite NEGATIVE " <100 ng/mL    Opiates NEGATIVE <100 ng/mL    Oxycodone NEGATIVE <100 ng/mL    Phencyclidine NEGATIVE <25 ng/mL    Creatinine 46.9 > or = 20.0 mg/dL    pH 7.5 4.5 - 9.0    Oxidant NEGATIVE <200 mcg/mL    Notes and Comments       Results are as expected.     Controlled Substance Agreement:  Date of the Last Agreement: 3/11/2025  Reviewed Controlled Substance Agreement including but not limited to the benefits, risks, and alternatives to treatment with a Controlled Substance medication(s).    Opioids:  What is the patient's goal of therapy? Reduce pain on as needed basis  Is this being achieved with current treatment? yes    I have calculated the patient's Morphine Dose Equivalent (MED):   I have considered referral to Pain Management and/or a specialist, and do not feel it is necessary at this time.    I feel that it is clinically indicated to continue this current medication regimen after consideration of alternative therapies, and other non-opioid treatment.    Opioid Risk Screening:  Family History of Substance Abuse  Alcohol: 0 (6/18/2025  8:00 AM)  Illegal Drugs: 0 (6/18/2025  8:00 AM)  Prescription Drugs: 0 (6/18/2025  8:00 AM)    Personal History of Substance Abuse  Alcohol: 0 (6/18/2025  8:00 AM)  Illegal Drugs: 0 (6/18/2025  8:00 AM)  Prescription Drugs: 0 (6/18/2025  8:00 AM)    Patient Age (16-45)  Age (16-45): 0 (6/18/2025  8:00 AM)    History of Preadolescent Sexual Abuse  History of Preadolescent Sexual Abuse: .0 (6/18/2025  8:00 AM)    Psychological Disease  Attention Deficit Disorder, Obsessive Compulsive Disorder, Bipolar, Schizophrenia: 0 (6/18/2025  8:00 AM)  Depression: 0 (6/18/2025  8:00 AM)    Total Score  Total Score: 0 (6/18/2025  8:00 AM)    Total Score Risk Category  TOTAL SCORE CATEGORY: Low Risk (0-3) (6/18/2025  8:00 AM)        Pain Assessment:  Analgesia  What was your pain level on average during the past week?: 3  What was your pain level at its worst during the past week?: 7  What  percentage of your pain has been relieved during the past week?: 35 %  Is the amount of pain relief you are now obtaining from your current pain relievers enough to make a real difference in your life?: Y  Query to Clinician: Is the patient's pain relief clinically significant?: Yes    Activities of Daily Living  Physical Functioning: Same  Family Relationships: Same  Social Relationships: Same  Mood: Same  Sleep Patterns: Same  Overall Functioning: Same    Adverse Events  Is patient experiencing any side effects from current pain relievers?: N  Patient's Overall Severity of Side Effects: None      Assessment  Is your overall impression that this patient is benefiting from opioid therapy?: Yes  Specific Analgesic Plan: Continue present regimen          Review of Systems  Constitutional: Patient denies any fever, chills, loss of appetite, or unexplained weight loss.  HEENT: Denies any headache, sore throat, eye pain, ear pain, decreased vision, or decreased hearing. Patient also denies any rhinorrhea.    Cardiovascular: Patient denies any shortness of breath with exertion, tachycardia, palpitations, orthopnea, or paroxysmal nocturnal dyspnea.  Positive for left-sided chest pain.    Respiratory: Patient denies any cough, shortness of breath, or wheezing.  Gastrointestinal: Patient denies any nausea, vomiting, diarrhea, constipation, melena, hematochezia, or reflux symptoms  Musculoskeletal: Patient denies any myalgia, arthralgia, joint swelling, or joint deformity  Skin: Denies any rashes or skin lesions  Neurology: Patient denies any new motor or sensory losses. Denies any numbness, tingling, weakness, and incoordination of the extremities. Patient also denies any tremor, seizures, or gait instability.  Endocrinology: Denies any polyuria, polydipsia, polyphagia, or heat/cold intolerance.  Psychiatric: Denies any anxiety, depression, or suicidal/homicidal ideation.  Hematology: Patient denies any abnormal bruising or  "bleeding.    /89 (BP Location: Right arm, Patient Position: Sitting, BP Cuff Size: Adult)   Pulse 87   Temp 36.5 °C (97.7 °F) (Temporal)   Ht 1.778 m (5' 10\")   Wt 88.3 kg (194 lb 11.2 oz)   SpO2 93%   BMI 27.94 kg/m²     Physical Exam  Gen. Appearance: Alert and cooperative, no acute distress, well-developed/well-nourished male.  Head: Normocephalic and atraumatic  EENT: Pupils are equal round reactive to light, extraocular muscles are intact, mucous membranes are moist, external auditory canals and tympanic membranes are within normal limits bilaterally, pharynx is without erythema or exudate, there is no noted rhinorrhea.  Neck: Supple and without adenopathy, no JVD at 90° and no carotid bruits are noted. There is no thyromegaly, thyroid tenderness, or palpable thyroid nodules.  Cardiovascular: Regular rate and rhythm without murmur or ectopy.  Respiratory: Clear to auscultation bilaterally with good air exchange.  Abdomen: Soft, nontender/nondistended. No masses, guarding, rebound, or rigidity. No hepatosplenomegaly, abdominal bruits, or CVA tenderness. Bowel sounds are normal. There is no widening of the aortic pulsation.  Musculoskeletal: Patient has good range of motion of the shoulders, elbows, wrists, hips, knees. There are no noted joint effusions or deformities.  Skin: Good turgor, moist, warm and without rashes or lesions.  Lymph nodes: No cervical, clavicular, or inguinal adenopathy.  Neurological exam: Alert and oriented ×3, no tremor, normal gait.  Psychiatric: Appropriate mood and affect, good insight and judgment, no delusions or thought disorders, no suicidal or homicidal ideation.  Extremities: No clubbing, cyanosis, or edema      Assessment/Plan   Hospital Discharge Follow-up:  Available hospital records were reviewed and discussed.   Medication list was reviewed and reconciled.     Pneumonia of left lung due to infectious organism, unspecified part of lung:  Will start him on " Augmentin and Zithromax.  He will go to the ER if symptoms seem to be worsening.   Encouraged smoking cessation.  Provided note exempting him from work.   - amoxicillin-clavulanate (Augmentin) 875-125 mg tablet; Take 1 tablet (875 mg of amoxicillin) by mouth 2 times a day for 10 days.  Dispense: 20 tablet; Refill: 0  - azithromycin (Zithromax) 250 mg tablet; Take 2 tablets (500 mg) by mouth once daily for 1 day, THEN 1 tablet (250 mg) once daily for 4 days.  Dispense: 6 tablet; Refill: 0    Chronic bilateral low back pain with left-sided sciatica:  Symptoms have remained stable but persistent.  Pt has seen pain mgmt who recommended physical therapy.  12/11/2024: We changed gabapentin to the 300 mg capsules. He will take 3 capsules three times per day (900 mg per dose).   Recommended he follow up with pain management after the completion of physical therapy.   3/11/2025: Recommended follow up with pain management as well.  6/18/2025: Will continue the current medication.  7/15/2025: Continue current medication.       Will follow up on 7/19/2025 for reevaluation.         Scribe Attestation  By signing my name below, I, Theodore Lewis   attest that this documentation has been prepared under the direction and in the presence of Francisco Ashford DO.    Requested Prescriptions     Signed Prescriptions Disp Refills    traMADol (Ultram) 50 mg tablet 120 tablet 0     Sig: Take 2 tablets (100 mg) by mouth every 12 hours if needed for severe pain (7 - 10).    amoxicillin-clavulanate (Augmentin) 875-125 mg tablet 20 tablet 0     Sig: Take 1 tablet (875 mg of amoxicillin) by mouth 2 times a day for 10 days.    azithromycin (Zithromax) 250 mg tablet 6 tablet 0     Sig: Take 2 tablets (500 mg) by mouth once daily for 1 day, THEN 1 tablet (250 mg) once daily for 4 days.

## 2025-07-15 NOTE — PROGRESS NOTES
Discharge Facility: [facility]  Discharge Diagnosis: Acute pneumonia   Admission Date: 7/12/25  Discharge Date: 7/13/25    PCP Appointment Date: 7/15/25  Specialist Appointment Date: TBD  Hospital Encounter and Summary Linked: Yes     Hospital Encounter     TCM outreach deferred as this patient has a follow up scheduled with PCP within 2 business days of DC on 7/15/25

## 2025-07-16 ENCOUNTER — TELEPHONE (OUTPATIENT)
Dept: PAIN MEDICINE | Facility: CLINIC | Age: 55
End: 2025-07-16
Payer: COMMERCIAL

## 2025-07-16 NOTE — TELEPHONE ENCOUNTER
Placed call to schedule appointment, no voicemail is setup. Unable to leave message        ----- Message from Jovana SIMEON sent at 7/10/2025  1:16 PM EDT -----  Regarding: web/online appt request  Message    Request submitted on 2025 at 1:25:41 PM  Form Title: Request an Appointment  Submitted Data  ----------------------------------------    Appointment Information  Epic Provider Name: Ronni Arauz  Specialty: Pain Medicine (Anesthesiology)  Have you seen this provider in the last 3 years? Yes  Reason for Appointment: Follow up on shot that I received in the neck for pain. Helped some, starting to wear off.     Preferred Time of Day: Morning     Preferred Day: Friday     [place]     Preferred Zip: [id number]     Patient Information  First Name: Kobe  Middle:  Last Name: Rachelle  Birth Date: Sun 1970  Gender: Male  Patient Address: [address] [place] [address]  Patient City: [place]  Patient State: [place]  Patient Zip: [address]     Contact Information  First Name: Kobe  Last Name: Rachelle  Phone Number: [phone]  Email: [email or web address]     Insurance Information  Insurance Carrier: [employer]  Insurance Plan: PPO  Subscriber: Myself  Patient MemberId: [id number]  Patient SSN: [id number]  Patient GroupNumber:  Subscriber First Name:  Subscriber Last Name:  subscriberID:  Relationship To Subscriber:       Guarantor Information  Guarantor First Name:  Guarantor First Name:  Guarantor :  Guarantor SSN:  Guarantor City:  Guarantor State:  Guarantor Zip:  Guarantor Home Phone:  Guarantor Work Phone:  Guarantor Legal Sex:  Relationship To Patient:  Account Type:

## 2025-07-18 ENCOUNTER — PATIENT OUTREACH (OUTPATIENT)
Dept: PRIMARY CARE | Facility: CLINIC | Age: 55
End: 2025-07-18
Payer: COMMERCIAL

## 2025-07-18 NOTE — PROGRESS NOTES
Unable to reach patient for follow up call after recent hospitalization.  no voicemail available call attempts x 2 were made to contact the patient to assist with any questions or concerns patient may have.

## 2025-07-19 ENCOUNTER — OFFICE VISIT (OUTPATIENT)
Dept: PRIMARY CARE | Facility: CLINIC | Age: 55
End: 2025-07-19
Payer: COMMERCIAL

## 2025-07-19 VITALS
DIASTOLIC BLOOD PRESSURE: 78 MMHG | TEMPERATURE: 97.3 F | WEIGHT: 191.7 LBS | BODY MASS INDEX: 27.44 KG/M2 | HEIGHT: 70 IN | HEART RATE: 97 BPM | OXYGEN SATURATION: 93 % | SYSTOLIC BLOOD PRESSURE: 116 MMHG

## 2025-07-19 DIAGNOSIS — J18.9 PNEUMONIA OF LEFT LUNG DUE TO INFECTIOUS ORGANISM, UNSPECIFIED PART OF LUNG: ICD-10-CM

## 2025-07-19 PROCEDURE — 3008F BODY MASS INDEX DOCD: CPT | Performed by: FAMILY MEDICINE

## 2025-07-19 PROCEDURE — 3074F SYST BP LT 130 MM HG: CPT | Performed by: FAMILY MEDICINE

## 2025-07-19 PROCEDURE — 99212 OFFICE O/P EST SF 10 MIN: CPT | Performed by: FAMILY MEDICINE

## 2025-07-19 PROCEDURE — 3078F DIAST BP <80 MM HG: CPT | Performed by: FAMILY MEDICINE

## 2025-07-19 ASSESSMENT — PATIENT HEALTH QUESTIONNAIRE - PHQ9
SUM OF ALL RESPONSES TO PHQ9 QUESTIONS 1 AND 2: 0
1. LITTLE INTEREST OR PLEASURE IN DOING THINGS: NOT AT ALL
2. FEELING DOWN, DEPRESSED OR HOPELESS: NOT AT ALL

## 2025-07-19 NOTE — PATIENT INSTRUCTIONS
Continue to work on the smoking.    Finish the antibiotics as prescribed.    Follow up as scheduled and as needed.    It was a pleasure to see you today. Thank you for choosing us for your health care needs.    If you have lab or other testing completed and have not been informed of results within one week, please call the office for your results.    If you haven't done so, consider signing up for [facility] PDV, the [facility] personal health record. Ask the staff how you can get started.      Schedule your lab draw using one of the following options:  Visit [employer]/locations  Log in to your [email or web address]arti account  Call [phone] ([phone])  Or scan the QR code below

## 2025-07-19 NOTE — PROGRESS NOTES
"Subjective   Patient ID: Kobe Cisneros is a 55 y.o. male who presents for Follow-up.    HPI     Here to follow up on hospital follow up on his pneumonia.  Was started on antibiotics at his last visit.    Patient states feeling \" pretty good\".  Patient states no trouble breathing.        Review of Systems    Objective   There were no vitals taken for this visit.    Physical Exam    Assessment/Plan   {Assess/PlanSmartLinks:66902}       " left lung due to infectious organism, unspecified part of lung  Patient advised to complete antibiotic therapy and follow up if symptoms not completely resolved at the end of the antibiotic therapy or sooner should the symptoms worsen or new symptoms develop.  Orders:    Follow Up In [facility] - PCP - Established

## 2025-08-13 ENCOUNTER — APPOINTMENT (OUTPATIENT)
Dept: PRIMARY CARE | Facility: CLINIC | Age: 55
End: 2025-08-13
Payer: COMMERCIAL

## 2025-08-18 DIAGNOSIS — G89.29 CHRONIC BILATERAL LOW BACK PAIN WITH LEFT-SIDED SCIATICA: ICD-10-CM

## 2025-08-18 DIAGNOSIS — M54.42 CHRONIC BILATERAL LOW BACK PAIN WITH LEFT-SIDED SCIATICA: ICD-10-CM

## 2025-08-18 RX ORDER — TRAMADOL HYDROCHLORIDE 50 MG/1
100 TABLET, FILM COATED ORAL EVERY 12 HOURS PRN
Qty: 120 TABLET | Refills: 0 | Status: SHIPPED | OUTPATIENT
Start: 2025-08-18

## 2025-08-20 ENCOUNTER — PATIENT OUTREACH (OUTPATIENT)
Dept: PRIMARY CARE | Facility: CLINIC | Age: 55
End: 2025-08-20
Payer: COMMERCIAL

## 2025-09-18 ENCOUNTER — APPOINTMENT (OUTPATIENT)
Dept: PRIMARY CARE | Facility: CLINIC | Age: 55
End: 2025-09-18
Payer: COMMERCIAL

## (undated) DEVICE — SYRINGE BLB 50CC IRRIG PLIABLE FNGR FLNG GRAD FLSK DISP

## (undated) DEVICE — MEDI-VAC NON-CONDUCTIVE SUCTION TUBING: Brand: CARDINAL HEALTH

## (undated) DEVICE — SKIN MARKER,REGULAR TIP WITH RULER: Brand: DEVON

## (undated) DEVICE — MEDI-VAC YANKAUER SUCTION HANDLE W/BULBOUS TIP: Brand: CARDINAL HEALTH

## (undated) DEVICE — SUTURE VCRL + SZ 3-0 L27IN ABSRB UD L26MM SH 1/2 CIR VCP416H

## (undated) DEVICE — PENCIL ES L3M BTTN SWCH HOLSTER W/ BLDE ELECTRD EDGE

## (undated) DEVICE — 2000CC GUARDIAN II: Brand: GUARDIAN

## (undated) DEVICE — ELECTRODE PT RET AD L9FT HI MOIST COND ADH HYDRGEL CORDED

## (undated) DEVICE — GOWN,AURORA,NONREINFORCED,LARGE: Brand: MEDLINE

## (undated) DEVICE — GLOVE SURG 5.5 PF POLYISOPRENE WHT STRL SENSICARE MIC

## (undated) DEVICE — SUTURE PROL SZ 0 L30IN NONABSORBABLE BLU L36MM CT-1 1/2 CIR 8424H

## (undated) DEVICE — 1842 FOAM BLOCK NEEDLE COUNTER: Brand: DEVON

## (undated) DEVICE — INTENDED FOR TISSUE SEPARATION, AND OTHER PROCEDURES THAT REQUIRE A SHARP SURGICAL BLADE TO PUNCTURE OR CUT.: Brand: BARD-PARKER ® CARBON RIB-BACK BLADES

## (undated) DEVICE — SUTURE VCRL SZ 3-0 L54IN ABSRB VLT LIGAPAK REEL NDL J205G

## (undated) DEVICE — GAUZE,SPONGE,4"X4",12PLY,STERILE,LF,2'S: Brand: MEDLINE

## (undated) DEVICE — STANDARD SURGICAL GOWN, L: Brand: CONVERTORS

## (undated) DEVICE — SUTURE VCRL + SZ 3-0 L36IN ABSRB UD L36MM CT-1 1/2 CIR VCP944H

## (undated) DEVICE — LABEL MED MED CHPOR

## (undated) DEVICE — SLEEVE CMPR SM STD CALF SCD ANEMB LF

## (undated) DEVICE — TRAY PREP DRY W/ PREM GLV 2 APPL 6 SPNG 2 UNDPD 1 OVERWRAP

## (undated) DEVICE — SPONGE,LAP,18"X18",DLX,XR,ST,5/PK,40/PK: Brand: MEDLINE

## (undated) DEVICE — PACK,LAPAROTOMY,NO GOWNS: Brand: MEDLINE

## (undated) DEVICE — PAD,ABDOMINAL,8"X10",ST,LF: Brand: MEDLINE